# Patient Record
Sex: FEMALE | Race: OTHER | NOT HISPANIC OR LATINO | URBAN - METROPOLITAN AREA
[De-identification: names, ages, dates, MRNs, and addresses within clinical notes are randomized per-mention and may not be internally consistent; named-entity substitution may affect disease eponyms.]

---

## 2017-08-10 ENCOUNTER — EMERGENCY (EMERGENCY)
Facility: HOSPITAL | Age: 62
LOS: 1 days | Discharge: HOME | End: 2017-08-10
Attending: EMERGENCY MEDICINE
Payer: MEDICAID

## 2017-08-10 VITALS
OXYGEN SATURATION: 100 % | DIASTOLIC BLOOD PRESSURE: 120 MMHG | WEIGHT: 154.98 LBS | HEART RATE: 73 BPM | TEMPERATURE: 98 F | HEIGHT: 62 IN | RESPIRATION RATE: 16 BRPM

## 2017-08-10 DIAGNOSIS — H81.10 BENIGN PAROXYSMAL VERTIGO, UNSPECIFIED EAR: ICD-10-CM

## 2017-08-10 DIAGNOSIS — E78.5 HYPERLIPIDEMIA, UNSPECIFIED: ICD-10-CM

## 2017-08-10 DIAGNOSIS — G43.909 MIGRAINE, UNSPECIFIED, NOT INTRACTABLE, WITHOUT STATUS MIGRAINOSUS: ICD-10-CM

## 2017-08-10 DIAGNOSIS — R53.1 WEAKNESS: ICD-10-CM

## 2017-08-10 LAB
ALBUMIN SERPL ELPH-MCNC: 3.2 G/DL — LOW (ref 3.5–5)
ALP SERPL-CCNC: 71 U/L — SIGNIFICANT CHANGE UP (ref 40–120)
ALT FLD-CCNC: 36 U/L DA — SIGNIFICANT CHANGE UP (ref 10–60)
ANION GAP SERPL CALC-SCNC: 8 MMOL/L — SIGNIFICANT CHANGE UP (ref 5–17)
AST SERPL-CCNC: 28 U/L — SIGNIFICANT CHANGE UP (ref 10–40)
BASOPHILS # BLD AUTO: 0.1 K/UL — SIGNIFICANT CHANGE UP (ref 0–0.2)
BASOPHILS NFR BLD AUTO: 1 % — SIGNIFICANT CHANGE UP (ref 0–2)
BILIRUB SERPL-MCNC: 0.2 MG/DL — SIGNIFICANT CHANGE UP (ref 0.2–1.2)
BUN SERPL-MCNC: 23 MG/DL — HIGH (ref 7–18)
CALCIUM SERPL-MCNC: 9.1 MG/DL — SIGNIFICANT CHANGE UP (ref 8.4–10.5)
CHLORIDE SERPL-SCNC: 106 MMOL/L — SIGNIFICANT CHANGE UP (ref 96–108)
CK MB BLD-MCNC: 0.8 % — SIGNIFICANT CHANGE UP (ref 0–3.5)
CK MB CFR SERPL CALC: 2 NG/ML — SIGNIFICANT CHANGE UP (ref 0–3.6)
CK SERPL-CCNC: 254 U/L — HIGH (ref 21–215)
CO2 SERPL-SCNC: 27 MMOL/L — SIGNIFICANT CHANGE UP (ref 22–31)
CREAT SERPL-MCNC: 0.95 MG/DL — SIGNIFICANT CHANGE UP (ref 0.5–1.3)
EOSINOPHIL # BLD AUTO: 0.2 K/UL — SIGNIFICANT CHANGE UP (ref 0–0.5)
EOSINOPHIL NFR BLD AUTO: 2.3 % — SIGNIFICANT CHANGE UP (ref 0–6)
GLUCOSE SERPL-MCNC: 108 MG/DL — HIGH (ref 70–99)
HCT VFR BLD CALC: 35.6 % — SIGNIFICANT CHANGE UP (ref 34.5–45)
HGB BLD-MCNC: 11.4 G/DL — LOW (ref 11.5–15.5)
LYMPHOCYTES # BLD AUTO: 2.7 K/UL — SIGNIFICANT CHANGE UP (ref 1–3.3)
LYMPHOCYTES # BLD AUTO: 31.5 % — SIGNIFICANT CHANGE UP (ref 13–44)
MAGNESIUM SERPL-MCNC: 2.4 MG/DL — SIGNIFICANT CHANGE UP (ref 1.6–2.6)
MCHC RBC-ENTMCNC: 30.7 PG — SIGNIFICANT CHANGE UP (ref 27–34)
MCHC RBC-ENTMCNC: 32.1 GM/DL — SIGNIFICANT CHANGE UP (ref 32–36)
MCV RBC AUTO: 95.6 FL — SIGNIFICANT CHANGE UP (ref 80–100)
MONOCYTES # BLD AUTO: 0.6 K/UL — SIGNIFICANT CHANGE UP (ref 0–0.9)
MONOCYTES NFR BLD AUTO: 7.3 % — SIGNIFICANT CHANGE UP (ref 2–14)
NEUTROPHILS # BLD AUTO: 5 K/UL — SIGNIFICANT CHANGE UP (ref 1.8–7.4)
NEUTROPHILS NFR BLD AUTO: 57.9 % — SIGNIFICANT CHANGE UP (ref 43–77)
NT-PROBNP SERPL-SCNC: 45 PG/ML — SIGNIFICANT CHANGE UP (ref 0–125)
PLATELET # BLD AUTO: 207 K/UL — SIGNIFICANT CHANGE UP (ref 150–400)
POTASSIUM SERPL-MCNC: 4.3 MMOL/L — SIGNIFICANT CHANGE UP (ref 3.5–5.3)
POTASSIUM SERPL-SCNC: 4.3 MMOL/L — SIGNIFICANT CHANGE UP (ref 3.5–5.3)
PROT SERPL-MCNC: 7.4 G/DL — SIGNIFICANT CHANGE UP (ref 6–8.3)
RBC # BLD: 3.72 M/UL — LOW (ref 3.8–5.2)
RBC # FLD: 12.8 % — SIGNIFICANT CHANGE UP (ref 10.3–14.5)
SODIUM SERPL-SCNC: 141 MMOL/L — SIGNIFICANT CHANGE UP (ref 135–145)
TROPONIN I SERPL-MCNC: <0.015 NG/ML — SIGNIFICANT CHANGE UP (ref 0–0.04)
WBC # BLD: 8.6 K/UL — SIGNIFICANT CHANGE UP (ref 3.8–10.5)
WBC # FLD AUTO: 8.6 K/UL — SIGNIFICANT CHANGE UP (ref 3.8–10.5)

## 2017-08-10 PROCEDURE — 99284 EMERGENCY DEPT VISIT MOD MDM: CPT | Mod: 25

## 2017-08-10 PROCEDURE — 82553 CREATINE MB FRACTION: CPT

## 2017-08-10 PROCEDURE — 85027 COMPLETE CBC AUTOMATED: CPT

## 2017-08-10 PROCEDURE — 99285 EMERGENCY DEPT VISIT HI MDM: CPT

## 2017-08-10 PROCEDURE — 96375 TX/PRO/DX INJ NEW DRUG ADDON: CPT

## 2017-08-10 PROCEDURE — 80053 COMPREHEN METABOLIC PANEL: CPT

## 2017-08-10 PROCEDURE — 84484 ASSAY OF TROPONIN QUANT: CPT

## 2017-08-10 PROCEDURE — 83880 ASSAY OF NATRIURETIC PEPTIDE: CPT

## 2017-08-10 PROCEDURE — 70450 CT HEAD/BRAIN W/O DYE: CPT

## 2017-08-10 PROCEDURE — 83735 ASSAY OF MAGNESIUM: CPT

## 2017-08-10 PROCEDURE — 82550 ASSAY OF CK (CPK): CPT

## 2017-08-10 PROCEDURE — 70450 CT HEAD/BRAIN W/O DYE: CPT | Mod: 26

## 2017-08-10 PROCEDURE — 96374 THER/PROPH/DIAG INJ IV PUSH: CPT

## 2017-08-10 RX ORDER — METOCLOPRAMIDE HCL 10 MG
10 TABLET ORAL ONCE
Qty: 0 | Refills: 0 | Status: COMPLETED | OUTPATIENT
Start: 2017-08-10 | End: 2017-08-10

## 2017-08-10 RX ORDER — DIPHENHYDRAMINE HCL 50 MG
50 CAPSULE ORAL ONCE
Qty: 0 | Refills: 0 | Status: COMPLETED | OUTPATIENT
Start: 2017-08-10 | End: 2017-08-10

## 2017-08-10 RX ORDER — MECLIZINE HCL 12.5 MG
1 TABLET ORAL
Qty: 21 | Refills: 0
Start: 2017-08-10 | End: 2017-08-17

## 2017-08-10 RX ORDER — ONDANSETRON 8 MG/1
4 TABLET, FILM COATED ORAL ONCE
Qty: 0 | Refills: 0 | Status: COMPLETED | OUTPATIENT
Start: 2017-08-10 | End: 2017-08-10

## 2017-08-10 RX ORDER — ONDANSETRON 8 MG/1
1 TABLET, FILM COATED ORAL
Qty: 15 | Refills: 0
Start: 2017-08-10 | End: 2017-08-15

## 2017-08-10 RX ORDER — MECLIZINE HCL 12.5 MG
12.5 TABLET ORAL ONCE
Qty: 0 | Refills: 0 | Status: COMPLETED | OUTPATIENT
Start: 2017-08-10 | End: 2017-08-10

## 2017-08-10 RX ADMIN — Medication 12.5 MILLIGRAM(S): at 19:21

## 2017-08-10 RX ADMIN — Medication 104 MILLIGRAM(S): at 19:21

## 2017-08-10 RX ADMIN — Medication 102 MILLIGRAM(S): at 19:21

## 2017-08-10 RX ADMIN — Medication 12.5 MILLIGRAM(S): at 21:40

## 2017-08-10 RX ADMIN — ONDANSETRON 4 MILLIGRAM(S): 8 TABLET, FILM COATED ORAL at 21:45

## 2017-08-10 NOTE — ED ADULT NURSE NOTE - OBJECTIVE STATEMENT
States" I have been having r.ear ache, nausea and dizziness(describes as room spinning) since a month".

## 2017-08-10 NOTE — ED PROVIDER NOTE - MUSCULOSKELETAL, MLM
Spine appears normal, range of motion is not limited, no muscle or joint tenderness, no calf tenderness

## 2017-08-10 NOTE — ED PROVIDER NOTE - OBJECTIVE STATEMENT
61 y/o F pt w/ PMHx of HLD presents to the ED c/o dizziness. Pt states that for the past month she was having intermittent ear pain; last week pt began to feel dizzy intermittently and developed a headache. Associates nausea, weakness and photophobia secondary to symptoms. Pt took Aleve to no relief. Denies vomiting, LOC, fever or any other complaints. NKDA.

## 2018-11-30 ENCOUNTER — APPOINTMENT (OUTPATIENT)
Dept: VASCULAR SURGERY | Facility: CLINIC | Age: 63
End: 2018-11-30
Payer: MEDICAID

## 2018-11-30 VITALS
HEART RATE: 73 BPM | DIASTOLIC BLOOD PRESSURE: 78 MMHG | WEIGHT: 160 LBS | HEIGHT: 62 IN | SYSTOLIC BLOOD PRESSURE: 115 MMHG | BODY MASS INDEX: 29.44 KG/M2

## 2018-11-30 DIAGNOSIS — M79.604 PAIN IN RIGHT LEG: ICD-10-CM

## 2018-11-30 DIAGNOSIS — I83.90 ASYMPTOMATIC VARICOSE VEINS OF UNSPECIFIED LOWER EXTREMITY: ICD-10-CM

## 2018-11-30 DIAGNOSIS — M79.605 PAIN IN RIGHT LEG: ICD-10-CM

## 2018-11-30 PROCEDURE — 93970 EXTREMITY STUDY: CPT

## 2018-11-30 PROCEDURE — 99204 OFFICE O/P NEW MOD 45 MIN: CPT

## 2018-11-30 RX ORDER — CHOLECALCIFEROL (VITAMIN D3) 25 MCG
TABLET ORAL
Refills: 0 | Status: ACTIVE | COMMUNITY

## 2018-11-30 RX ORDER — BACILLUS COAGULANS/INULIN 1B-250 MG
CAPSULE ORAL
Refills: 0 | Status: ACTIVE | COMMUNITY

## 2018-11-30 RX ORDER — ASCORBIC ACID 500 MG
TABLET ORAL
Refills: 0 | Status: ACTIVE | COMMUNITY

## 2020-08-17 ENCOUNTER — FORM ENCOUNTER (OUTPATIENT)
Age: 65
End: 2020-08-17

## 2020-08-17 DIAGNOSIS — M25.562 PAIN IN LEFT KNEE: ICD-10-CM

## 2020-08-24 ENCOUNTER — APPOINTMENT (OUTPATIENT)
Dept: ORTHOPEDIC SURGERY | Facility: CLINIC | Age: 65
End: 2020-08-24
Payer: MEDICARE

## 2020-08-24 VITALS
HEIGHT: 61 IN | WEIGHT: 155 LBS | HEART RATE: 61 BPM | SYSTOLIC BLOOD PRESSURE: 125 MMHG | TEMPERATURE: 97.2 F | BODY MASS INDEX: 29.27 KG/M2 | DIASTOLIC BLOOD PRESSURE: 76 MMHG

## 2020-08-24 PROCEDURE — 73564 X-RAY EXAM KNEE 4 OR MORE: CPT | Mod: LT

## 2020-08-24 PROCEDURE — 73560 X-RAY EXAM OF KNEE 1 OR 2: CPT | Mod: RT

## 2020-08-24 PROCEDURE — 99204 OFFICE O/P NEW MOD 45 MIN: CPT

## 2020-08-24 RX ORDER — CHLORHEXIDINE GLUCONATE, 0.12% ORAL RINSE 1.2 MG/ML
0.12 SOLUTION DENTAL
Qty: 946 | Refills: 0 | Status: ACTIVE | COMMUNITY
Start: 2020-07-27

## 2020-08-24 RX ORDER — DICLOFENAC SODIUM 10 MG/G
1 GEL TOPICAL
Qty: 100 | Refills: 0 | Status: ACTIVE | COMMUNITY
Start: 2020-05-29

## 2020-08-24 RX ORDER — LOPERAMIDE HYDROCHLORIDE 2 MG/1
2 CAPSULE ORAL
Qty: 12 | Refills: 0 | Status: ACTIVE | COMMUNITY
Start: 2020-04-01

## 2020-08-24 RX ORDER — SODIUM FLUORIDE 1.1 G/100G
1.1 GEL, DENTIFRICE ORAL
Qty: 51 | Refills: 0 | Status: ACTIVE | COMMUNITY
Start: 2019-07-03

## 2020-08-24 NOTE — ADDENDUM
[FreeTextEntry1] : This note was written by Yaima Mora on 08/24/2020 acting as scribe for Dr. Faizan Jesus M.D.\par \par I, Dr. Faizan Jesus, have read and attest that all the information, medical decision making and discharge instructions within are true and accurate.

## 2020-08-24 NOTE — HISTORY OF PRESENT ILLNESS
[de-identified] : 64 y/o female presents for initial evaluation of Left knee pain x 9 years. Denies any injuries. She is accompanied by her daughter. Patient reports left knee pain that is diffuse to the back of the knee and is sharp in nature. She report the pain has progressively worsened and is exacerbated with activities. Also reports buckling, locking, swelling and loss of motion. She has decreased the distance in her daily walks and reports increase in pain x last few months Patient takes the stairs one at a time using the handrail. She has been taking Tylenol in the past with minimum relief and does not take NSAID as she reports they upsets her stomach. She uses Voltaren gel with mild relief she Saw Dr. Gunderson and received cortisone injection and saw him again in June 24 with cortisone no relief of pain. She had MRI done July 29 and Dr. Nino recommended to see Dr. Jesus for surgical arthroscopy and advised for left knee symptoms and treatment plan.\par \par Denies: Fever, chills, nausea, vomiting. \par

## 2020-08-24 NOTE — PHYSICAL EXAM
[de-identified] : General appearance: well nourished and hydrated, pleasant, alert and oriented x 3, cooperative.\par HEENT: Normocephalic, EOM intact, Nasal septum midline, Oral cavity clear, External auditory canal clear.\par Cardiovascular: no apparent abnormalities, no lower leg edema, no varicosities, pedal pulses are palpable.\par Lymphatics Lymph nodes: none palpated, Lymphedema: not present.\par Neurologic: sensation is normal, no muscle weakness in upper or lower extremities, patella tendon reflexes intact .\par Dermatologic no apparent skin lesions, moist, warm, no rash.\par Spine:cervical spine appears normal and moves freely, thoracic spine appears normal and moves freely, lumbosacral spine appears normal and moves freely.\par Gait: nonantalgic.\par \par Left knee\par Inspection: Trace effusion \par Wounds: none.\par Alignment: normal.\par Palpation: Medial  tenderness on palpation.\par ROM active (in degrees):0-120 with pain and crepitus Very apprehensive \par Ligamentous laxity: all ligaments appear stable,, negative ant. drawer test, negative post. drawer test, stable to varus stress test, stable to valgus stress test. negative Lachman's test, negative pivot shift test\par Meniscal Test: negative McMurrays, negative Elma.\par Patellofemoral Alignment Test: Q angle-, normal.\par Muscle Test: good quad strength.\par \par Right knee\par Inspection: no effusion or erythema.\par Wounds: none.\par Alignment: normal.\par Palpation: no specific tenderness on palpation.\par ROM active (in degrees):0-130 no instability and good strength\par Ligamentous laxity: all ligaments appear stable,, negative ant. drawer test, negative post. drawer test, stable to varus stress test, stable to valgus stress test. negative Lachman's test, negative pivot shift test\par Meniscal Test: negative McMurrays, negative Elma.\par Patellofemoral Alignment Test: Q angle-, normal.\par Muscle Test: good quad strength.\par \par Left hip\par Inspection: No swelling or ecchymosis.\par Wounds: none.\par Palpation: non-tender.\par Stability: no instability.\par Strength: 5/5 all motor groups.\par ROM: no pain with FROM.\par Leg length: equal.\par \par Right hip\par Inspection: No swelling or ecchymosis.\par Wounds: none.\par Palpation: non-tender.\par Stability: no instability.\par Strength: 5/5 all motor groups.\par ROM: no pain with FROM.\par Leg length: equal.\par \par Left ankle\par Inspection: no erythema noted, no swelling noted.\par Palpation: no pain on palpation .\par ROM: FROM without crepitus.\par Muscle strength: 5/5.\par Stability: no instability noted.\par \par Right ankle\par Inspection: no erythema noted, no swelling noted.\par ROM: FROM without crepitus.\par Palpation: no pain on palpation .\par Muscle strength: 5/5.\par Stability: no instability noted.\par \par Left foot\par Inspection: color, texture and turgor are normal.\par ROM: full range of motion of all joints without pain or crepitus.\par Palpation: no tenderness.\par Stability: no instability noted.\par \par Right foot\par Inspection: color, texture and turgor are normal.\par ROM: full range of motion of all joints without pain or crepitus.\par Palpation: no tenderness.\par Stability: no instability noted.\par \par Left shoulder\par Inspection: no muscle asymmetry, no atrophy.\par Palpation: no tenderness noted, ACJ non-tender.\par ROM: full active ROM, full passive ROM.\par Strength testing): anterior deltoid, supraspinatus, infraspinatus, subscapularis all 5/5.\par Stability test: ant. apprehension negative, post. apprehension negative, relocation test negative.\par Impingement Test: negative NEER.\par \par Right shoulder\par Inspection: no muscle asymmetry, no atrophy.\par Palpation: no tenderness noted, ACJ non-tender.\par ROM: full active ROM, full passive ROM.\par Strength testing): anterior deltoid, supraspinatus, infraspinatus, subscapularis all 5/5.\par Stability test: ant. apprehension negative, post. apprehension negative, relocation test negative.\par Impingement Test: negative NEER.\par Surgical Wounds: none.\par \par Left elbow\par Inspection: negative swelling.\par Wounds: none.\par Palpation: non-tender.\par ROM: full ROM.\par Strength: 5/5 all groups.\par Stability: no instability.\par Mass: none.\par \par Right elbow\par Inspection: negative swelling.\par Wounds: none.\par Palpation: non-tender.\par ROM: full ROM.\par Strength: 5/5 all groups.\par Stability: no instability.\par Mass: none.\par \par Left wrist\par Inspection: negative swelling.\par Wound: none.\par Palpation (bone): no tenderness.\par ROM: full ROM.\par Strength: full , good.\par \par Right wrist\par Inspection: negative swelling.\par Wound: none.\par Palpation (bone): no tenderness.\par ROM: full ROM.\par Strength: full , good.\par \par Left hand\par Inspection: no skin changes, normal appearance.\par Wounds: none.\par Strength: full , able to make full fist.\par Sensation: light touch intact all fingers and thumb.\par Vascular: good capillary refill < 3 seconds, all fingers and thumb.\par Mass: none.\par \par Right hand\par Inspection: no skin changes, normal appearance.\par Wounds: none.\par Strength: full , able to make full fist.\par Sensation: light touch intact all fingers and thumb.\par Vascular: good capillary refill < 3 seconds, all fingers and thumb.\par Mass: none.  [de-identified] : Left knee: Tricompartmental degenerative arthritis, medial joint space narrowing, sclerosis, bone on bone, marginal osteophytes, patella sits centrally with patellofemoral joint space narrowing and osteophyte formation consistent with patellofemoral arthritis\par \par Right merchant. knee xray merchant view taken at the office today demonstrates joint space narrowing, marginal osteophytes, sclerosis consistent with patellofemoral arthritis\par \par MRI Left knee taken 7/20/20: degenerative arthritis and torn medial meniscus films brought in and reviewed, see attached report. I agree with radiologist report including

## 2020-09-10 ENCOUNTER — OUTPATIENT (OUTPATIENT)
Dept: OUTPATIENT SERVICES | Facility: HOSPITAL | Age: 65
LOS: 1 days | Discharge: HOME | End: 2020-09-10
Payer: MEDICARE

## 2020-09-10 VITALS
WEIGHT: 160.94 LBS | HEART RATE: 60 BPM | HEIGHT: 62 IN | TEMPERATURE: 98 F | RESPIRATION RATE: 16 BRPM | OXYGEN SATURATION: 100 % | DIASTOLIC BLOOD PRESSURE: 74 MMHG | SYSTOLIC BLOOD PRESSURE: 136 MMHG

## 2020-09-10 DIAGNOSIS — M17.12 UNILATERAL PRIMARY OSTEOARTHRITIS, LEFT KNEE: ICD-10-CM

## 2020-09-10 DIAGNOSIS — Z98.891 HISTORY OF UTERINE SCAR FROM PREVIOUS SURGERY: Chronic | ICD-10-CM

## 2020-09-10 DIAGNOSIS — Z90.710 ACQUIRED ABSENCE OF BOTH CERVIX AND UTERUS: Chronic | ICD-10-CM

## 2020-09-10 DIAGNOSIS — E78.5 HYPERLIPIDEMIA, UNSPECIFIED: ICD-10-CM

## 2020-09-10 DIAGNOSIS — Z01.818 ENCOUNTER FOR OTHER PREPROCEDURAL EXAMINATION: ICD-10-CM

## 2020-09-10 LAB
ANION GAP SERPL CALC-SCNC: 7 MMOL/L — SIGNIFICANT CHANGE UP (ref 5–17)
APPEARANCE UR: CLEAR — SIGNIFICANT CHANGE UP
APTT BLD: 29 SEC — SIGNIFICANT CHANGE UP (ref 27.5–35.5)
BASOPHILS # BLD AUTO: 0.05 K/UL — SIGNIFICANT CHANGE UP (ref 0–0.2)
BASOPHILS NFR BLD AUTO: 0.6 % — SIGNIFICANT CHANGE UP (ref 0–2)
BILIRUB UR-MCNC: NEGATIVE — SIGNIFICANT CHANGE UP
BLD GP AB SCN SERPL QL: SIGNIFICANT CHANGE UP
BUN SERPL-MCNC: 19 MG/DL — SIGNIFICANT CHANGE UP (ref 7–23)
CALCIUM SERPL-MCNC: 9.1 MG/DL — SIGNIFICANT CHANGE UP (ref 8.5–10.1)
CHLORIDE SERPL-SCNC: 106 MMOL/L — SIGNIFICANT CHANGE UP (ref 96–108)
CO2 SERPL-SCNC: 25 MMOL/L — SIGNIFICANT CHANGE UP (ref 22–31)
COLOR SPEC: YELLOW — SIGNIFICANT CHANGE UP
CREAT SERPL-MCNC: 0.62 MG/DL — SIGNIFICANT CHANGE UP (ref 0.5–1.3)
DIFF PNL FLD: NEGATIVE — SIGNIFICANT CHANGE UP
EOSINOPHIL # BLD AUTO: 0.13 K/UL — SIGNIFICANT CHANGE UP (ref 0–0.5)
EOSINOPHIL NFR BLD AUTO: 1.5 % — SIGNIFICANT CHANGE UP (ref 0–6)
GLUCOSE SERPL-MCNC: 81 MG/DL — SIGNIFICANT CHANGE UP (ref 70–99)
GLUCOSE UR QL: NEGATIVE MG/DL — SIGNIFICANT CHANGE UP
HCT VFR BLD CALC: 34.9 % — SIGNIFICANT CHANGE UP (ref 34.5–45)
HGB BLD-MCNC: 11.4 G/DL — LOW (ref 11.5–15.5)
IMM GRANULOCYTES NFR BLD AUTO: 0.2 % — SIGNIFICANT CHANGE UP (ref 0–1.5)
INR BLD: 0.97 RATIO — SIGNIFICANT CHANGE UP (ref 0.88–1.16)
KETONES UR-MCNC: NEGATIVE — SIGNIFICANT CHANGE UP
LEUKOCYTE ESTERASE UR-ACNC: NEGATIVE — SIGNIFICANT CHANGE UP
LYMPHOCYTES # BLD AUTO: 2.07 K/UL — SIGNIFICANT CHANGE UP (ref 1–3.3)
LYMPHOCYTES # BLD AUTO: 24.4 % — SIGNIFICANT CHANGE UP (ref 13–44)
MCHC RBC-ENTMCNC: 30.1 PG — SIGNIFICANT CHANGE UP (ref 27–34)
MCHC RBC-ENTMCNC: 32.7 GM/DL — SIGNIFICANT CHANGE UP (ref 32–36)
MCV RBC AUTO: 92.1 FL — SIGNIFICANT CHANGE UP (ref 80–100)
MONOCYTES # BLD AUTO: 0.61 K/UL — SIGNIFICANT CHANGE UP (ref 0–0.9)
MONOCYTES NFR BLD AUTO: 7.2 % — SIGNIFICANT CHANGE UP (ref 2–14)
NEUTROPHILS # BLD AUTO: 5.6 K/UL — SIGNIFICANT CHANGE UP (ref 1.8–7.4)
NEUTROPHILS NFR BLD AUTO: 66.1 % — SIGNIFICANT CHANGE UP (ref 43–77)
NITRITE UR-MCNC: NEGATIVE — SIGNIFICANT CHANGE UP
NRBC # BLD: 0 /100 WBCS — SIGNIFICANT CHANGE UP (ref 0–0)
PH UR: 5 — SIGNIFICANT CHANGE UP (ref 5–8)
PLATELET # BLD AUTO: 242 K/UL — SIGNIFICANT CHANGE UP (ref 150–400)
POTASSIUM SERPL-MCNC: 4.3 MMOL/L — SIGNIFICANT CHANGE UP (ref 3.5–5.3)
POTASSIUM SERPL-SCNC: 4.3 MMOL/L — SIGNIFICANT CHANGE UP (ref 3.5–5.3)
PROT UR-MCNC: NEGATIVE MG/DL — SIGNIFICANT CHANGE UP
PROTHROM AB SERPL-ACNC: 11.3 SEC — SIGNIFICANT CHANGE UP (ref 10.6–13.6)
RBC # BLD: 3.79 M/UL — LOW (ref 3.8–5.2)
RBC # FLD: 12.6 % — SIGNIFICANT CHANGE UP (ref 10.3–14.5)
SODIUM SERPL-SCNC: 138 MMOL/L — SIGNIFICANT CHANGE UP (ref 135–145)
SP GR SPEC: 1.01 — SIGNIFICANT CHANGE UP (ref 1.01–1.02)
UROBILINOGEN FLD QL: NEGATIVE MG/DL — SIGNIFICANT CHANGE UP
WBC # BLD: 8.48 K/UL — SIGNIFICANT CHANGE UP (ref 3.8–10.5)
WBC # FLD AUTO: 8.48 K/UL — SIGNIFICANT CHANGE UP (ref 3.8–10.5)

## 2020-09-10 PROCEDURE — 93010 ELECTROCARDIOGRAM REPORT: CPT

## 2020-09-10 NOTE — OCCUPATIONAL THERAPY INITIAL EVALUATION ADULT - ADDITIONAL COMMENTS
Patient lives with spouse (Who can assist post op) in a private house with 4 steps to enter with bilateral handrails that are far apart. Once inside, the patient bedroom and bathroom is on that floor when entering. The patient bathroom has a tub/shower combination, fixed shower head, standard toilet and no grab bars. The patient ambulates with no device and does not own any device for ambulation. The patients daily pain is a 9/10 at rest and a 10/10 with movement. The patient manages the pain with rest, ice and Tylenol. The patient has no recent outpatient PT, no falls and sometimes the knee wilson. The patient wears glasses for reading, L handed, does not drive and has no hearing impairments.

## 2020-09-10 NOTE — H&P PST ADULT - NEGATIVE MUSCULOSKELETAL SYMPTOMS
no muscle cramps/no arm pain L/no arm pain R/no leg pain R/no muscle weakness/no back pain/no arthralgia/no myalgia/no joint swelling/no neck pain

## 2020-09-10 NOTE — H&P PST ADULT - NEGATIVE CARDIOVASCULAR SYMPTOMS
no dyspnea on exertion/no orthopnea/no palpitations/no claudication/no chest pain/no peripheral edema/no paroxysmal nocturnal dyspnea

## 2020-09-10 NOTE — PHYSICAL THERAPY INITIAL EVALUATION ADULT - ADDITIONAL COMMENTS
Pt lives with her  (whom can provide assist upon D/C home) in a private home, 4 entry steps (B/L rails, far apart), 1 level inside home.  Pt has a tub/shower combo with a fixed shower head, standard toilet seat height, & no grab bar. Pt states she is currently independent with all functional mobility including community ambulation without device. Pt states she is independent with ADL's as well. Pt reports daily 9/10 pain & states it is worse with any activity. Pt is left hand dominant, wears eye glasses, doesn't drive, & is retired.  Pt endorses taking narcotics for pain management. Goal of therapy: manage pain & improve functional mobility.

## 2020-09-10 NOTE — H&P PST ADULT - SKIN COMMENTS
Erythema of right back/flank, approx 10cm x 4 cm, warm to touch, very tender to palpation, no central punctate, no skin breaks indurated, not fluctuant due to how tense it is, localized cellulitis

## 2020-09-10 NOTE — H&P PST ADULT - NSICDXPROBLEM_GEN_ALL_CORE_FT
PROBLEM DIAGNOSES  Problem: Unilateral primary osteoarthritis, left knee  Assessment and Plan: Pre-op instructions given. Pt verbalized understanding  Chlorhexidine wash instructions given  Pending: M/C + Covid19 test/results scheduled 9/21/2020    Problem: Hyperlipidemia  Assessment and Plan: Pt instructed to take meds

## 2020-09-10 NOTE — H&P PST ADULT - HISTORY OF PRESENT ILLNESS
This is a 60 y/o female with HLD 62 y/o female with medical h/o HLD and OA, left knee, presents today for PST for Left Total Knee Replacement scheduled for 9/24/2020

## 2020-09-10 NOTE — PHYSICAL THERAPY INITIAL EVALUATION ADULT - PERTINENT HX OF CURRENT PROBLEM, REHAB EVAL
Patient attends pre-op testing today following consult c Dr. Jesus due to chronic pain to left knee. Elective L TKA is now scheduled in this facility for 9/24/2020.

## 2020-09-10 NOTE — H&P PST ADULT - MUSCULOSKELETAL
negative No joint pain, swelling or deformity; no limitation of movement details… detailed exam no joint warmth/decreased ROM due to pain/no joint swelling/no joint erythema/no calf tenderness/normal strength

## 2020-09-10 NOTE — H&P PST ADULT - FUNCTIONAL SCREEN CURRENT LEVEL: AMBULATION, MLM
2019  EMPLOYEE INFORMATION: EMPLOYER INFORMATION:   NAME: Danika WESLEY   : 1963 [phone]   DATE OF INJURY/EVENT: 2019           Location: [address]   Treating Provider: Jonah Yanez PA-C  Time In:  8:05 AM Time Out:  8:14 AM      DIAGNOSIS:   1. Strain of thoracic region, subsequent encounter    2. Right arm pain      STATUS: This injury is determined to be WORK RELATED.    RETURN TO WORK:   Employee may return to work without restrictions.  Employee is discharged from care.   Return Date: 2019            RESTRICTIONS:   Restrictions are to be followed at work and at home.  Restrictions are in effect until next follow-up visit.  None    TREATMENT PLAN:  Medications for this injury/condition:   None  Referral/Consult:  Diagnostic Testing:   None         Instructions:   You may resume normal duties, continue with your home exercise program.    NEXT RETURN VISIT: as needed     Thank you for the privilege of providing medical care for this injury/condition.  If there are any questions, please call the occupational health clinic at Dept: [phone].    Electronically signed on 2019 at 8:14 AM by:   Jonah Yanez PA-C   [facility]    The physician below agrees with the plan and restrictions placed on the patient by the provider above.  Lanre Perez MD  For any worker's compensation questions or needs, please call [employer]  at [phone].   0 = independent

## 2020-09-10 NOTE — PHYSICAL THERAPY INITIAL EVALUATION ADULT - RANGE OF MOTION EXAMINATION, REHAB EVAL
bilateral upper extremity ROM was WNL (within normal limits)/deficits as listed below/except left knee limited due to pain./bilateral lower extremity was ROM was WNL (within normal limits)

## 2020-09-11 LAB
A1C WITH ESTIMATED AVERAGE GLUCOSE RESULT: 5.7 % — HIGH (ref 4–5.6)
CULTURE RESULTS: SIGNIFICANT CHANGE UP
ESTIMATED AVERAGE GLUCOSE: 117 MG/DL — HIGH (ref 68–114)
MRSA PCR RESULT.: SIGNIFICANT CHANGE UP
S AUREUS DNA NOSE QL NAA+PROBE: SIGNIFICANT CHANGE UP
SPECIMEN SOURCE: SIGNIFICANT CHANGE UP

## 2020-09-21 ENCOUNTER — OUTPATIENT (OUTPATIENT)
Dept: OUTPATIENT SERVICES | Facility: HOSPITAL | Age: 65
LOS: 1 days | Discharge: HOME | End: 2020-09-21

## 2020-09-21 DIAGNOSIS — Z98.891 HISTORY OF UTERINE SCAR FROM PREVIOUS SURGERY: Chronic | ICD-10-CM

## 2020-09-21 DIAGNOSIS — U07.1 COVID-19: ICD-10-CM

## 2020-09-21 DIAGNOSIS — Z90.710 ACQUIRED ABSENCE OF BOTH CERVIX AND UTERUS: Chronic | ICD-10-CM

## 2020-09-21 LAB — SARS-COV-2 RNA SPEC QL NAA+PROBE: SIGNIFICANT CHANGE UP

## 2020-09-21 RX ORDER — POLYETHYLENE GLYCOL 3350 17 G/17G
17 POWDER, FOR SOLUTION ORAL DAILY
Refills: 0 | Status: DISCONTINUED | OUTPATIENT
Start: 2020-09-24 | End: 2020-09-25

## 2020-09-21 RX ORDER — METOCLOPRAMIDE HCL 10 MG
10 TABLET ORAL EVERY 6 HOURS
Refills: 0 | Status: DISCONTINUED | OUTPATIENT
Start: 2020-09-24 | End: 2020-09-24

## 2020-09-21 RX ORDER — BENZOCAINE AND MENTHOL 5; 1 G/100ML; G/100ML
1 LIQUID ORAL EVERY 6 HOURS
Refills: 0 | Status: DISCONTINUED | OUTPATIENT
Start: 2020-09-24 | End: 2020-09-25

## 2020-09-21 RX ORDER — SIMVASTATIN 20 MG/1
20 TABLET, FILM COATED ORAL AT BEDTIME
Refills: 0 | Status: DISCONTINUED | OUTPATIENT
Start: 2020-09-24 | End: 2020-09-25

## 2020-09-21 RX ORDER — ACETAMINOPHEN 500 MG
975 TABLET ORAL EVERY 6 HOURS
Refills: 0 | Status: DISCONTINUED | OUTPATIENT
Start: 2020-09-24 | End: 2020-09-25

## 2020-09-21 RX ORDER — OXYCODONE HYDROCHLORIDE 5 MG/1
5 TABLET ORAL EVERY 4 HOURS
Refills: 0 | Status: DISCONTINUED | OUTPATIENT
Start: 2020-09-24 | End: 2020-09-25

## 2020-09-21 RX ORDER — DEXAMETHASONE 0.5 MG/5ML
10 ELIXIR ORAL ONCE
Refills: 0 | Status: COMPLETED | OUTPATIENT
Start: 2020-09-25 | End: 2020-09-25

## 2020-09-21 RX ORDER — ENOXAPARIN SODIUM 100 MG/ML
40 INJECTION SUBCUTANEOUS EVERY 24 HOURS
Refills: 0 | Status: DISCONTINUED | OUTPATIENT
Start: 2020-09-25 | End: 2020-09-25

## 2020-09-21 RX ORDER — MAGNESIUM HYDROXIDE 400 MG/1
30 TABLET, CHEWABLE ORAL DAILY
Refills: 0 | Status: DISCONTINUED | OUTPATIENT
Start: 2020-09-24 | End: 2020-09-25

## 2020-09-21 RX ORDER — SODIUM CHLORIDE 9 MG/ML
1000 INJECTION, SOLUTION INTRAVENOUS
Refills: 0 | Status: DISCONTINUED | OUTPATIENT
Start: 2020-09-24 | End: 2020-09-25

## 2020-09-21 RX ORDER — OXYCODONE HYDROCHLORIDE 5 MG/1
10 TABLET ORAL EVERY 4 HOURS
Refills: 0 | Status: DISCONTINUED | OUTPATIENT
Start: 2020-09-24 | End: 2020-09-25

## 2020-09-21 RX ORDER — ONDANSETRON 8 MG/1
4 TABLET, FILM COATED ORAL EVERY 6 HOURS
Refills: 0 | Status: DISCONTINUED | OUTPATIENT
Start: 2020-09-24 | End: 2020-09-25

## 2020-09-21 RX ORDER — HYDROMORPHONE HYDROCHLORIDE 2 MG/ML
0.5 INJECTION INTRAMUSCULAR; INTRAVENOUS; SUBCUTANEOUS EVERY 4 HOURS
Refills: 0 | Status: DISCONTINUED | OUTPATIENT
Start: 2020-09-24 | End: 2020-09-25

## 2020-09-21 RX ORDER — SENNA PLUS 8.6 MG/1
2 TABLET ORAL AT BEDTIME
Refills: 0 | Status: DISCONTINUED | OUTPATIENT
Start: 2020-09-24 | End: 2020-09-25

## 2020-09-21 RX ORDER — CEFAZOLIN SODIUM 1 G
2000 VIAL (EA) INJECTION EVERY 8 HOURS
Refills: 0 | Status: COMPLETED | OUTPATIENT
Start: 2020-09-24 | End: 2020-09-24

## 2020-09-21 RX ORDER — CELECOXIB 200 MG/1
200 CAPSULE ORAL DAILY
Refills: 0 | Status: DISCONTINUED | OUTPATIENT
Start: 2020-09-25 | End: 2020-09-25

## 2020-09-23 ENCOUNTER — FORM ENCOUNTER (OUTPATIENT)
Age: 65
End: 2020-09-23

## 2020-09-24 ENCOUNTER — RESULT REVIEW (OUTPATIENT)
Age: 65
End: 2020-09-24

## 2020-09-24 ENCOUNTER — TRANSCRIPTION ENCOUNTER (OUTPATIENT)
Age: 65
End: 2020-09-24

## 2020-09-24 ENCOUNTER — INPATIENT (INPATIENT)
Facility: HOSPITAL | Age: 65
LOS: 0 days | Discharge: HOME HEALTH | End: 2020-09-25
Attending: ORTHOPAEDIC SURGERY | Admitting: ORTHOPAEDIC SURGERY
Payer: MEDICARE

## 2020-09-24 ENCOUNTER — APPOINTMENT (OUTPATIENT)
Dept: ORTHOPEDIC SURGERY | Facility: HOSPITAL | Age: 65
End: 2020-09-24

## 2020-09-24 VITALS
DIASTOLIC BLOOD PRESSURE: 50 MMHG | HEART RATE: 73 BPM | SYSTOLIC BLOOD PRESSURE: 132 MMHG | RESPIRATION RATE: 20 BRPM | TEMPERATURE: 98 F | HEIGHT: 62 IN | OXYGEN SATURATION: 99 % | WEIGHT: 158.07 LBS

## 2020-09-24 DIAGNOSIS — Z98.891 HISTORY OF UTERINE SCAR FROM PREVIOUS SURGERY: Chronic | ICD-10-CM

## 2020-09-24 DIAGNOSIS — Z90.710 ACQUIRED ABSENCE OF BOTH CERVIX AND UTERUS: Chronic | ICD-10-CM

## 2020-09-24 LAB
ANION GAP SERPL CALC-SCNC: 6 MMOL/L — SIGNIFICANT CHANGE UP (ref 5–17)
BUN SERPL-MCNC: 18 MG/DL — SIGNIFICANT CHANGE UP (ref 7–23)
CALCIUM SERPL-MCNC: 8.9 MG/DL — SIGNIFICANT CHANGE UP (ref 8.5–10.1)
CHLORIDE SERPL-SCNC: 112 MMOL/L — HIGH (ref 96–108)
CO2 SERPL-SCNC: 24 MMOL/L — SIGNIFICANT CHANGE UP (ref 22–31)
CREAT SERPL-MCNC: 0.84 MG/DL — SIGNIFICANT CHANGE UP (ref 0.5–1.3)
GLUCOSE SERPL-MCNC: 162 MG/DL — HIGH (ref 70–99)
HCT VFR BLD CALC: 35.1 % — SIGNIFICANT CHANGE UP (ref 34.5–45)
HGB BLD-MCNC: 11 G/DL — LOW (ref 11.5–15.5)
INR BLD: 1.08 RATIO — SIGNIFICANT CHANGE UP (ref 0.88–1.16)
MCHC RBC-ENTMCNC: 30.2 PG — SIGNIFICANT CHANGE UP (ref 27–34)
MCHC RBC-ENTMCNC: 31.3 GM/DL — LOW (ref 32–36)
MCV RBC AUTO: 96.4 FL — SIGNIFICANT CHANGE UP (ref 80–100)
NRBC # BLD: 0 /100 WBCS — SIGNIFICANT CHANGE UP (ref 0–0)
PLATELET # BLD AUTO: 235 K/UL — SIGNIFICANT CHANGE UP (ref 150–400)
POTASSIUM SERPL-MCNC: 4.6 MMOL/L — SIGNIFICANT CHANGE UP (ref 3.5–5.3)
POTASSIUM SERPL-SCNC: 4.6 MMOL/L — SIGNIFICANT CHANGE UP (ref 3.5–5.3)
PROTHROM AB SERPL-ACNC: 12.5 SEC — SIGNIFICANT CHANGE UP (ref 10.6–13.6)
RBC # BLD: 3.64 M/UL — LOW (ref 3.8–5.2)
RBC # FLD: 12.8 % — SIGNIFICANT CHANGE UP (ref 10.3–14.5)
SODIUM SERPL-SCNC: 142 MMOL/L — SIGNIFICANT CHANGE UP (ref 135–145)
WBC # BLD: 10.89 K/UL — HIGH (ref 3.8–10.5)
WBC # FLD AUTO: 10.89 K/UL — HIGH (ref 3.8–10.5)

## 2020-09-24 PROCEDURE — 27447 TOTAL KNEE ARTHROPLASTY: CPT | Mod: LT

## 2020-09-24 PROCEDURE — 88305 TISSUE EXAM BY PATHOLOGIST: CPT | Mod: 26

## 2020-09-24 PROCEDURE — 73560 X-RAY EXAM OF KNEE 1 OR 2: CPT | Mod: 26,LT

## 2020-09-24 PROCEDURE — 88311 DECALCIFY TISSUE: CPT | Mod: 26

## 2020-09-24 RX ORDER — FENTANYL CITRATE 50 UG/ML
50 INJECTION INTRAVENOUS
Refills: 0 | Status: DISCONTINUED | OUTPATIENT
Start: 2020-09-24 | End: 2020-09-24

## 2020-09-24 RX ORDER — DOCUSATE SODIUM 100 MG
1 CAPSULE ORAL
Qty: 21 | Refills: 0
Start: 2020-09-24 | End: 2020-09-30

## 2020-09-24 RX ORDER — ASPIRIN/CALCIUM CARB/MAGNESIUM 324 MG
1 TABLET ORAL
Qty: 60 | Refills: 0
Start: 2020-09-24 | End: 2020-10-23

## 2020-09-24 RX ORDER — SIMVASTATIN 20 MG/1
1 TABLET, FILM COATED ORAL
Qty: 0 | Refills: 0 | DISCHARGE

## 2020-09-24 RX ORDER — SODIUM CHLORIDE 9 MG/ML
3 INJECTION INTRAMUSCULAR; INTRAVENOUS; SUBCUTANEOUS EVERY 8 HOURS
Refills: 0 | Status: DISCONTINUED | OUTPATIENT
Start: 2020-09-24 | End: 2020-09-24

## 2020-09-24 RX ORDER — ACETAMINOPHEN 500 MG
650 TABLET ORAL ONCE
Refills: 0 | Status: COMPLETED | OUTPATIENT
Start: 2020-09-24 | End: 2020-09-24

## 2020-09-24 RX ORDER — SODIUM CHLORIDE 9 MG/ML
1000 INJECTION, SOLUTION INTRAVENOUS
Refills: 0 | Status: DISCONTINUED | OUTPATIENT
Start: 2020-09-24 | End: 2020-09-24

## 2020-09-24 RX ORDER — CELECOXIB 200 MG/1
200 CAPSULE ORAL ONCE
Refills: 0 | Status: COMPLETED | OUTPATIENT
Start: 2020-09-24 | End: 2020-09-24

## 2020-09-24 RX ORDER — FENTANYL CITRATE 50 UG/ML
25 INJECTION INTRAVENOUS
Refills: 0 | Status: DISCONTINUED | OUTPATIENT
Start: 2020-09-24 | End: 2020-09-24

## 2020-09-24 RX ORDER — ENOXAPARIN SODIUM 100 MG/ML
1 INJECTION SUBCUTANEOUS
Qty: 14 | Refills: 0
Start: 2020-09-24 | End: 2020-10-07

## 2020-09-24 RX ORDER — ASPIRIN/CALCIUM CARB/MAGNESIUM 324 MG
1 TABLET ORAL
Qty: 84 | Refills: 0
Start: 2020-09-24 | End: 2020-11-04

## 2020-09-24 RX ADMIN — Medication 975 MILLIGRAM(S): at 12:20

## 2020-09-24 RX ADMIN — SODIUM CHLORIDE 3 MILLILITER(S): 9 INJECTION INTRAMUSCULAR; INTRAVENOUS; SUBCUTANEOUS at 06:57

## 2020-09-24 RX ADMIN — Medication 100 MILLIGRAM(S): at 23:55

## 2020-09-24 RX ADMIN — SODIUM CHLORIDE 100 MILLILITER(S): 9 INJECTION, SOLUTION INTRAVENOUS at 10:07

## 2020-09-24 RX ADMIN — OXYCODONE HYDROCHLORIDE 10 MILLIGRAM(S): 5 TABLET ORAL at 15:25

## 2020-09-24 RX ADMIN — OXYCODONE HYDROCHLORIDE 10 MILLIGRAM(S): 5 TABLET ORAL at 22:20

## 2020-09-24 RX ADMIN — OXYCODONE HYDROCHLORIDE 10 MILLIGRAM(S): 5 TABLET ORAL at 21:24

## 2020-09-24 RX ADMIN — SIMVASTATIN 20 MILLIGRAM(S): 20 TABLET, FILM COATED ORAL at 21:24

## 2020-09-24 RX ADMIN — Medication 975 MILLIGRAM(S): at 17:48

## 2020-09-24 RX ADMIN — Medication 975 MILLIGRAM(S): at 23:55

## 2020-09-24 RX ADMIN — SODIUM CHLORIDE 75 MILLILITER(S): 9 INJECTION, SOLUTION INTRAVENOUS at 12:22

## 2020-09-24 RX ADMIN — CELECOXIB 200 MILLIGRAM(S): 200 CAPSULE ORAL at 06:56

## 2020-09-24 RX ADMIN — Medication 100 MILLIGRAM(S): at 16:21

## 2020-09-24 RX ADMIN — Medication 975 MILLIGRAM(S): at 17:29

## 2020-09-24 RX ADMIN — Medication 650 MILLIGRAM(S): at 06:56

## 2020-09-24 RX ADMIN — Medication 975 MILLIGRAM(S): at 14:12

## 2020-09-24 RX ADMIN — OXYCODONE HYDROCHLORIDE 10 MILLIGRAM(S): 5 TABLET ORAL at 14:57

## 2020-09-24 NOTE — PROGRESS NOTE ADULT - ASSESSMENT
Assessment:  65y Female s/p LEFT TKA POD #0    -Pain control  -DVT ppx: Lovenox in house starting POD #1  -WBAT/OOB with assistance as needed  -PT/OT  -Ice and elevate  -Encourage incentive spirometry  -Medical management per primary team  -DC planning  -Will discuss with attending and will advise if plan changes.     Assessment:  65y Female s/p LEFT TKA POD #0    -Pain control  -DVT ppx: Lovenox in house starting POD #1  -WBAT/OOB with assistance as needed  -PT/OT  -Ice and elevate  -Encourage incentive spirometry  -Monitor drain ouput  -DC planning  -Will discuss with attending and will advise if plan changes.

## 2020-09-24 NOTE — PHYSICAL THERAPY INITIAL EVALUATION ADULT - GAIT TRAINING, PT EVAL
Pt will be able to ambulate using assistive device up to 200 ft or more, be able to negotiate steps safely observing proper gait, posture and prevent falls.

## 2020-09-24 NOTE — PHYSICAL THERAPY INITIAL EVALUATION ADULT - PERTINENT HX OF CURRENT PROBLEM, REHAB EVAL
Patient with complain of chronic pain in the left knee joint, diagnosed with OA left knee , s/p TKA left knee. WBAT status .

## 2020-09-24 NOTE — DISCHARGE NOTE PROVIDER - HOSPITAL COURSE
Hospital Course:  The patient is a 65y Female status post elective Total Knee Arthroplasty to the left knee after failing outpatient nonoperative conservative management. Patient presented to [facility] after being medically cleared for an elective surgical procedure. The patient was taken to the operating room on date mentioned above. Prophylactic antibiotics were started before the procedure and continued for 24 hours. There were no complications during the procedure and patient tolerated the procedure well. The patient was transferred to the recovery room in stable condition and subsequently to the surgical floor. The patient was placed on Lovenox for anticoagulation while in house to continue until POD 14, then starting POD 15 patient instructed to start ASA 81mg PO BID for a total of 30 days. All home medications were continued. The patient received physical therapy daily and daily labs were followed. The hemovac drain was DC'd on POD #1. The dressing was kept clean, dry, intact. The rest of the hospital stay was unremarkable. Hospital Course:  The patient is a 65y Female status post elective Total Knee Arthroplasty to the left knee after failing outpatient nonoperative conservative management. Patient presented to [facility] after being medically cleared for an elective surgical procedure. The patient was taken to the operating room on date mentioned above. Prophylactic antibiotics were started before the procedure and continued for 24 hours. There were no complications during the procedure and patient tolerated the procedure well. The patient was transferred to the recovery room in stable condition and subsequently to the surgical floor. The patient was placed on Lovenox for anticoagulation while in house then patient instructed to start ASA 81mg PO BID for a total of 6 weeks after leaving the. hospital All home medications were continued. The patient received physical therapy daily and daily labs were followed. The hemovac drain was DC'd on POD #1. The dressing was kept clean, dry, intact. The rest of the hospital stay was unremarkable.

## 2020-09-24 NOTE — OCCUPATIONAL THERAPY INITIAL EVALUATION ADULT - PLANNED THERAPY INTERVENTIONS, OT EVAL
balance training/ROM/bed mobility training/ADL retraining/strengthening/stretching/transfer training

## 2020-09-24 NOTE — DISCHARGE NOTE PROVIDER - CARE PROVIDER_API CALL
Faizan Jesus  ORTHOPAEDIC SURGERY  [address] [place] [address]  Phone: [phone]  Fax: [phone]  Follow Up Time:

## 2020-09-24 NOTE — PHYSICAL THERAPY INITIAL EVALUATION ADULT - PLANNED THERAPY INTERVENTIONS, PT EVAL
strengthening/bed mobility training/gait training/ROM/transfer training/balance training/stair climbing ex.

## 2020-09-24 NOTE — OCCUPATIONAL THERAPY INITIAL EVALUATION ADULT - ADDITIONAL COMMENTS
Pre op assessment- Patient lives with spouse (Who can assist post op) in a private house with 4 steps to enter with bilateral handrails that are far apart. Once inside, the patient bedroom and bathroom is on that floor when entering. The patient bathroom has a tub/shower combination, fixed shower head, standard toilet and no grab bars.

## 2020-09-24 NOTE — DISCHARGE NOTE NURSING/CASE MANAGEMENT/SOCIAL WORK - PATIENT PORTAL LINK FT
You can access the FollowMyHealth Patient Portal offered by [facility] by registering at the following website: [email or web address]. By joining [facility]’s FollowMyHealth portal, you will also be able to view your health information using other applications (apps) compatible with our system.

## 2020-09-24 NOTE — DISCHARGE NOTE PROVIDER - NSDCMRMEDTOKEN_GEN_ALL_CORE_FT
simvastatin 20 mg oral tablet: 1 tab(s) orally once a day (at bedtime)   aspirin 81 mg oral delayed release tablet: 1 tab(s) orally 2 times a day       Colace 100 mg oral capsule: 1 cap(s) orally 3 times a day, As Needed -for constipation   oxycodone-acetaminophen 5 mg-325 mg oral tablet: 1 tab(s) orally every 4 hours MDD:6  simvastatin 20 mg oral tablet: 1 tab(s) orally once a day (at bedtime)

## 2020-09-24 NOTE — PHYSICAL THERAPY INITIAL EVALUATION ADULT - LIVES WITH, PROFILE
Pt lives with her  (whom can provide assist upon D/C home) in a private home, 4 entry steps (B/L rails, far apart), 1 level inside home.

## 2020-09-24 NOTE — ASU PREOP CHECKLIST - SITE MARKED BY ANESTHESIOLOGIST
Triage: Patient presents from home via EMS for complaints of bright red blood in her stool. \" I have had too many episodes to count it's constant\". Also complaining of feeling weak and nausea. n/a

## 2020-09-24 NOTE — PHYSICAL THERAPY INITIAL EVALUATION ADULT - ADDITIONAL COMMENTS
· Additional Comments	Pt lives with her  (whom can provide assist upon D/C home) in a private home, 4 entry steps (B/L rails, far apart), 1 level inside home.  Pt has a tub/shower combo with a fixed shower head, standard toilet seat height, & no grab bar. Pt states she is currently independent with all functional mobility including community ambulation without device. Pt states she is independent with ADL's as well. Pt reports daily 9/10 pain & states it is worse with any activity. Pt is left hand dominant, wears eye glasses, doesn't drive, & is retired.  Pt endorses taking narcotics for pain management. Goal of therapy: manage pain & improve functional mobility.

## 2020-09-24 NOTE — DISCHARGE NOTE PROVIDER - NSDCFUADDINST_GEN_ALL_CORE_FT
1.	Pain Control  2.	Walking with full weight bearing as tolerated, with assistive devices (walker/Cane as Needed)  3.	DVT Prophylaxis, Lovenox 40mg subcutaneous daily for 14 days. Day 15 start Aspirin 81 mg twice a day for 30 days. do not skip doses.  4.	PT as needed  5.	Follow up with Dr. Jesus as Outpatient in 10-14 Days after Discharge from the Hospital or Rehab. Call Office For Appointment. [phone]  6.	Remove Staples Post-Op Day 21, and Remove Dressing Post-Op Day 10, with Daily Dressing Changes as Need.  7.	Ice/Elevate affected area as Needed  8.	Keep Dressing Clean and dry.   1.	Pain Control  2.	Walking with full weight bearing as tolerated, with assistive devices (walker/Cane as Needed)  3.	DVT Prophylaxis, Aspirin 81 mg twice a day for 6 weeks. do not skip doses.  4.	PT as needed  5.	Follow up with Dr. Jesus as Outpatient in 10-14 Days after Discharge from the Hospital or Rehab. Call Office For Appointment. [phone]  6.	Remove Staples Post-Op Day 21, and Remove Dressing Post-Op Day 10, with Daily Dressing Changes as Need.  7.	Ice/Elevate affected area as Needed  8.	Keep Dressing Clean and dry.

## 2020-09-24 NOTE — PHYSICAL THERAPY INITIAL EVALUATION ADULT - STRENGTHENING, PT EVAL
Improve strength in the left lower extremities to 5/5  and be able to perform functional tasks-bed mobility, sitting, standing, transfers and ambulate in a safe manner with  assistive device and prevent falls.

## 2020-09-25 VITALS
DIASTOLIC BLOOD PRESSURE: 68 MMHG | RESPIRATION RATE: 18 BRPM | OXYGEN SATURATION: 100 % | SYSTOLIC BLOOD PRESSURE: 127 MMHG | HEART RATE: 60 BPM

## 2020-09-25 LAB
ANION GAP SERPL CALC-SCNC: 9 MMOL/L — SIGNIFICANT CHANGE UP (ref 5–17)
BUN SERPL-MCNC: 17 MG/DL — SIGNIFICANT CHANGE UP (ref 7–23)
CALCIUM SERPL-MCNC: 8.9 MG/DL — SIGNIFICANT CHANGE UP (ref 8.5–10.1)
CHLORIDE SERPL-SCNC: 105 MMOL/L — SIGNIFICANT CHANGE UP (ref 96–108)
CO2 SERPL-SCNC: 24 MMOL/L — SIGNIFICANT CHANGE UP (ref 22–31)
CREAT SERPL-MCNC: 0.73 MG/DL — SIGNIFICANT CHANGE UP (ref 0.5–1.3)
GLUCOSE SERPL-MCNC: 130 MG/DL — HIGH (ref 70–99)
HCT VFR BLD CALC: 34.5 % — SIGNIFICANT CHANGE UP (ref 34.5–45)
HGB BLD-MCNC: 10.7 G/DL — LOW (ref 11.5–15.5)
MCHC RBC-ENTMCNC: 29.6 PG — SIGNIFICANT CHANGE UP (ref 27–34)
MCHC RBC-ENTMCNC: 31 GM/DL — LOW (ref 32–36)
MCV RBC AUTO: 95.3 FL — SIGNIFICANT CHANGE UP (ref 80–100)
NRBC # BLD: 0 /100 WBCS — SIGNIFICANT CHANGE UP (ref 0–0)
PLATELET # BLD AUTO: 244 K/UL — SIGNIFICANT CHANGE UP (ref 150–400)
POTASSIUM SERPL-MCNC: 4.8 MMOL/L — SIGNIFICANT CHANGE UP (ref 3.5–5.3)
POTASSIUM SERPL-SCNC: 4.8 MMOL/L — SIGNIFICANT CHANGE UP (ref 3.5–5.3)
RBC # BLD: 3.62 M/UL — LOW (ref 3.8–5.2)
RBC # FLD: 12.9 % — SIGNIFICANT CHANGE UP (ref 10.3–14.5)
SODIUM SERPL-SCNC: 138 MMOL/L — SIGNIFICANT CHANGE UP (ref 135–145)
WBC # BLD: 12.8 K/UL — HIGH (ref 3.8–10.5)
WBC # FLD AUTO: 12.8 K/UL — HIGH (ref 3.8–10.5)

## 2020-09-25 RX ADMIN — Medication 975 MILLIGRAM(S): at 12:51

## 2020-09-25 RX ADMIN — Medication 975 MILLIGRAM(S): at 05:58

## 2020-09-25 RX ADMIN — OXYCODONE HYDROCHLORIDE 10 MILLIGRAM(S): 5 TABLET ORAL at 09:57

## 2020-09-25 RX ADMIN — Medication 102 MILLIGRAM(S): at 05:30

## 2020-09-25 RX ADMIN — CELECOXIB 200 MILLIGRAM(S): 200 CAPSULE ORAL at 12:50

## 2020-09-25 RX ADMIN — OXYCODONE HYDROCHLORIDE 10 MILLIGRAM(S): 5 TABLET ORAL at 10:57

## 2020-09-25 RX ADMIN — CELECOXIB 200 MILLIGRAM(S): 200 CAPSULE ORAL at 11:50

## 2020-09-25 RX ADMIN — Medication 975 MILLIGRAM(S): at 11:51

## 2020-09-25 RX ADMIN — Medication 975 MILLIGRAM(S): at 05:29

## 2020-09-25 RX ADMIN — Medication 1 TABLET(S): at 11:51

## 2020-09-25 RX ADMIN — Medication 975 MILLIGRAM(S): at 00:42

## 2020-09-25 RX ADMIN — OXYCODONE HYDROCHLORIDE 10 MILLIGRAM(S): 5 TABLET ORAL at 05:50

## 2020-09-25 RX ADMIN — ENOXAPARIN SODIUM 40 MILLIGRAM(S): 100 INJECTION SUBCUTANEOUS at 07:57

## 2020-09-25 RX ADMIN — OXYCODONE HYDROCHLORIDE 10 MILLIGRAM(S): 5 TABLET ORAL at 04:59

## 2020-09-25 RX ADMIN — POLYETHYLENE GLYCOL 3350 17 GRAM(S): 17 POWDER, FOR SOLUTION ORAL at 11:51

## 2020-09-25 NOTE — PROGRESS NOTE ADULT - ASSESSMENT
Assessment:  65y Female s/p LEFT TKA POD #1    -Pain control  -DVT ppx: Lovenox in house starting POD #1  -WBAT/OOB with assistance as needed  -PT/OT  -Ice and elevate  -Encourage incentive spirometry  -Drain DC'd  -DC planning likely Home today following AM PT session if patient is comfortable  -Will discuss with attending and will advise if plan changes.

## 2020-09-25 NOTE — PROGRESS NOTE ADULT - SUBJECTIVE AND OBJECTIVE BOX
Patient seen and examined at bedside, resting comfortably. Tolerated procedure well. Pain well controlled. Denies headache, lightheadedness, dizziness, chest pain, dyspnea, n/v, numbness/tingling. No complaints at this time. Discussed with patient possibility of home today, pt is agreeable.    Vital Signs Last 24 Hrs  T(C): 36.7 (25 Sep 2020 04:55), Max: 37.1 (24 Sep 2020 10:30)  T(F): 98.1 (25 Sep 2020 04:55), Max: 98.8 (24 Sep 2020 10:30)  HR: 62 (25 Sep 2020 04:55) (61 - 83)  BP: 107/65 (25 Sep 2020 04:55) (96/53 - 135/72)  BP(mean): --  RR: 16 (25 Sep 2020 04:55) (12 - 19)  SpO2: 98% (25 Sep 2020 04:55) (96% - 100%)         PHYSICAL EXAM  General: NAD, Awake and Alert    LEFT Lower Extremity:  Dressing c/d/i  Drain DC'd  SCDs in place  L2-S1 SILT  +TA/EHL/FHL/GSC  + DP pulses  Compartments soft and compressible  Calves nontender  
Ortho Postoperative Note     Patient seen and examined at bedside, resting comfortably. Pain well controlled. Denies headache, lightheadedness, dizziness, chest pain, dyspnea, n/v, numbness/tingling. No complaints at this time.     VITAL SIGNS  T(C): 37 (09-24-20 @ 14:30), Max: 37.1 (09-24-20 @ 10:30)  HR: 79 (09-24-20 @ 14:30) (70 - 83)  BP: 129/72 (09-24-20 @ 14:30) (96/53 - 132/50)  RR: 16 (09-24-20 @ 14:30) (12 - 20)  SpO2: 98% (09-24-20 @ 14:30) (96% - 100%)    LABS:                        11.0   10.89 )-----------( 235      ( 24 Sep 2020 09:56 )             35.1     09-24    142  |  112<H>  |  18  ----------------------------<  162<H>  4.6   |  24  |  0.84    Ca    8.9      24 Sep 2020 09:56      PT/INR - ( 24 Sep 2020 09:56 )   PT: 12.5 sec;   INR: 1.08 ratio         PHYSICAL EXAM  General: NAD, Awake and Alert    LEFT Lower Extremity:  Dressing c/d/i  Drain in place  SCDs in place  L2-S1 SILT  +TA/EHL/FHL/GSC  + DP pulses  Compartments soft and compressible  Calves nontender

## 2020-09-25 NOTE — CONSULT NOTE ADULT - SUBJECTIVE AND OBJECTIVE BOX
Chief Complaint:  Patient is a 65y old  Female who presents with a chief complaint of L TKA (25 Sep 2020 06:41)      HPI: Currently no sob or chest pain or palpitation . No current cough or fever . No current nausea or vomiting . Pain controlled . Voiding .      Review of Systems:    General:  No wt loss, fevers, chills, night sweats  Eyes:  Good vision, no reported pain  ENT:  No sore throat, pain, runny nose, dysphagia  CV:  No pain, palpitations, hypo/hypertension  Resp:  No dyspnea, cough, tachypnea, wheezing  GI:  No pain, nausea, vomiting, diarrhea, constipation  :  No pain, bleeding, incontinence, nocturia  Muscle:  No pain, weakness  Breast:  No pain, abscess, mass, discharge  Neuro:  No weakness, tingling, memory problems  Psych:  No fatigue, insomnia, mood problems, depression  Endocrine:  No polyuria, polydypsia, cold/heat intolerance  Heme:  No petechiae, ecchymosis, easy bruisability  Skin:  No rash, tattoos, scars, edema    Relevant Family History: DM . Allergy : NKDA .     Relevant Social History: No smoking .    PMh : Hyperlipidemia . Meds : Zocor .    Physical Exam:      Vital Signs:  Vital Signs Last 24 Hrs  T(C): 36.7 (25 Sep 2020 04:55), Max: 37.1 (24 Sep 2020 10:30)  T(F): 98.1 (25 Sep 2020 04:55), Max: 98.8 (24 Sep 2020 10:30)  HR: 62 (25 Sep 2020 04:55) (61 - 83)  BP: 107/65 (25 Sep 2020 04:55) (96/53 - 135/72)  BP(mean): --  RR: 16 (25 Sep 2020 04:55) (12 - 19)  SpO2: 98% (25 Sep 2020 04:55) (96% - 100%)  Daily Height in cm: 157.48 (24 Sep 2020 11:12)    Daily Weight in k.8 (24 Sep 2020 11:12)  I&O's Summary    24 Sep 2020 07:01  -  25 Sep 2020 07:00  --------------------------------------------------------  IN: 1392.5 mL / OUT: 235 mL / NET: 1157.5 mL        General:  Appears stated age, well-groomed, well-nourished, no distress  HEENT:  NC/AT, patent nares w/ pink mucosa, OP clear w/o lesions, PERRL, EOMI, conjunctivae clear, no thyromegaly, nodules, adenopathy, no JVD  Chest:  Full & symmetric excursion, no increased effort, breath sounds clear  Cardiovascular:  Regular rhythm, S1, S2, no murmur/rub/S3/S4, no carotid/femoral/abdominal bruit, radial/pedal pulses 2+, no edema  Abdomen:  Soft, non-tender, non-distended, normoactive bowel sounds, no HSM  Extremities: Slight fulness left knee . + pulse .  Skin:  No rash/erythema/ecchymoses/petechiae/wounds/abscess/warm/dry  Musculoskeletal: Intact .  Neuro/Psych:  Alert, oriented, normal and symmetric strength in UEs, LEs .     Laboratory:                            10.7   12.80 )-----------( 244      ( 25 Sep 2020 06:29 )             34.5     09-25    138  |  105  |  17  ----------------------------<  130<H>  4.8   |  24  |  0.73    Ca    8.9      25 Sep 2020 06:29            CAPILLARY BLOOD GLUCOSE          PT/INR - ( 24 Sep 2020 09:56 )   PT: 12.5 sec;   INR: 1.08 ratio               Imaging:      Assessment: S/P left knee replacement . Hyperlipidemia .      Plan: PT/OT . Pain control . DVT prophylaxis . Incentive spirometry . Current meds reviewed .

## 2020-09-28 LAB — SURGICAL PATHOLOGY STUDY: SIGNIFICANT CHANGE UP

## 2020-09-30 DIAGNOSIS — K21.9 GASTRO-ESOPHAGEAL REFLUX DISEASE WITHOUT ESOPHAGITIS: ICD-10-CM

## 2020-09-30 DIAGNOSIS — E78.5 HYPERLIPIDEMIA, UNSPECIFIED: ICD-10-CM

## 2020-09-30 DIAGNOSIS — M17.12 UNILATERAL PRIMARY OSTEOARTHRITIS, LEFT KNEE: ICD-10-CM

## 2020-10-12 VITALS — BODY MASS INDEX: 29.27 KG/M2 | HEIGHT: 61 IN | WEIGHT: 155 LBS

## 2020-10-16 ENCOUNTER — APPOINTMENT (OUTPATIENT)
Dept: ORTHOPEDIC SURGERY | Facility: CLINIC | Age: 65
End: 2020-10-16
Payer: MEDICARE

## 2020-10-16 PROCEDURE — 99024 POSTOP FOLLOW-UP VISIT: CPT

## 2020-10-16 NOTE — PROCEDURE
[de-identified] :  Left Knee sutures completely removed \par Observation on incision dry, clean, intact, well healed. Suture site Cleaned with iodine swab after sutures are completely removed. Instructions Keep incision dry and clean, allowed to shower and pat site dry, do not rub dry, contact office is site becomes red, swollen, infected, or you develop a fever.\par

## 2020-10-16 NOTE — HISTORY OF PRESENT ILLNESS
[Healed] : healed [Swelling] : swollen [Doing Well] : is doing well [Excellent Pain Control] : has excellent pain control [No Sign of Infection] : is showing no signs of infection [Staples Removed] : staples were removed [Chills] : no chills [Constipation] : no constipation [Fever] : no fever [Diarrhea] : no diarrhea [Dysuria] : no dysuria [Vomiting] : no vomiting [Nausea] : no nausea [Erythema] : not erythematous [Discharge] : absent of discharge [Dehiscence] : not dehisced [de-identified] : S/P Left TKR for staple removal [de-identified] : Continue Pt. Pain control, amoxicillin for dental prophylaxis, Medical alert card to rupesh mailed to the patient, aspirin for anticoagulation and follow up in 4 weeks with xrays [de-identified] : Left Knee rom 0-90 [de-identified] : Patient presents today 3 weeks s/p Left TKR for staple removal. Patient states that she  is doing well, taking Tylenol/oxycodone for pain, aspirin for anticoagulation and doing therapy.  I went over the operative report with the patient and she received a copy of the report for their own records.\par

## 2020-11-11 ENCOUNTER — APPOINTMENT (OUTPATIENT)
Dept: ORTHOPEDIC SURGERY | Facility: CLINIC | Age: 65
End: 2020-11-11
Payer: MEDICARE

## 2020-11-11 DIAGNOSIS — Z47.1 AFTERCARE FOLLOWING JOINT REPLACEMENT SURGERY: ICD-10-CM

## 2020-11-11 DIAGNOSIS — Z96.652 AFTERCARE FOLLOWING JOINT REPLACEMENT SURGERY: ICD-10-CM

## 2020-11-11 PROCEDURE — 73560 X-RAY EXAM OF KNEE 1 OR 2: CPT | Mod: RT

## 2020-11-11 PROCEDURE — 99024 POSTOP FOLLOW-UP VISIT: CPT

## 2020-11-11 PROCEDURE — 73562 X-RAY EXAM OF KNEE 3: CPT | Mod: LT

## 2020-11-11 NOTE — PHYSICAL EXAM
[de-identified] : General appearance: well nourished and hydrated, pleasant, alert and oriented x 3, cooperative.\par HEENT: Normocephalic, EOM intact, Nasal septum midline, Oral cavity clear, External auditory canal clear.\par Cardiovascular: no apparent abnormalities, no lower leg edema, no varicosities, pedal pulses are palpable.\par Lymphatics Lymph nodes: none palpated, Lymphedema: not present.\par Neurologic: sensation is normal, no muscle weakness in upper or lower extremities, patella tendon reflexes intact .\par Dermatologic no apparent skin lesions, moist, warm, no rash.\par Spine:cervical spine appears normal and moves freely, thoracic spine appears normal and moves freely, lumbosacral spine appears normal and moves freely.\par Gait: nonantalgic. \par \par Left Knee\par Inspection: no effusion, soft tissue swelling \par Wounds: healed midline incision\par Alignment: normal.\par Palpation: no specific tenderness on palpation.\par ROM: Active (in degrees): 0-95\par Ligamentous laxity (neg): negative ant. drawer test, negative post. drawer test, stable to varus stress test, stable to valgus stress test,\par Patellofemoral Alignment Test: Q angle-, normal.\par Muscle Test: good quad strength.\par Leg examination: calf is soft and non-tender.  [de-identified] : Left knee xray, AP, lateral, merchant view taken at the office today demonstrates a total knee replacement in satisfactory position and alignment. No evidence of loosening. The un-resurfaced patella sits in a central position. \par \par Right knee xray merchant view taken at the office today demonstrates patella sits at an appropriate height in a central position, joint space narrowing, small osteophytes, patellofemoral arthritis

## 2020-11-11 NOTE — REASON FOR VISIT
[Follow-Up Visit] : a follow-up visit for [Artificial Knee Joint] : artificial knee joint [Knee Pain] : knee pain [Other: ____] : [unfilled] [FreeTextEntry2] : s/p left total knee replacement

## 2020-11-11 NOTE — HISTORY OF PRESENT ILLNESS
[de-identified] : 65 year old female presents for follow-up evaluation s/p left total knee replacement at 6 weeks. Patient is doing well. She is currently taking Oxycodone prior to physical therapy and at night. She utilizes a cane prn, but is currently weaning off at this time. She denies any new major complaints or issues at this time. Patient is overall doing well.

## 2020-11-11 NOTE — DISCUSSION/SUMMARY
[de-identified] : Patient is doing well following their s/p left total knee replacement at 6 weeks. I reviewed x-rays with them. I have reassured her that her implants are functioning well. \par \par She is encouraged to stay active, and continue with her home exercise program, and physical therapy. We have provided a prescription for physical therapy. At this time she is advised to discontinue Oxycodone, and we have we have provided a prescription for Tramadol.\par \par She can continue activities as tolerated. All questions answered, understanding verbalized. Patient in agreement with plan of care. This was explained in the presence of her daughter. She may follow up with xrays in 6-8 weeks.

## 2020-11-11 NOTE — ADDENDUM
[FreeTextEntry1] : This note was written by Yudelka Lund on 11/11/2020 acting as scribe for Dr. Faizan Jesus M.D.\par \par I, Dr. Faizan Jesus, have read and attest that all the information, medical decision making and discharge instructions within are true and accurate.

## 2021-01-13 ENCOUNTER — APPOINTMENT (OUTPATIENT)
Dept: ORTHOPEDIC SURGERY | Facility: CLINIC | Age: 66
End: 2021-01-13
Payer: MEDICARE

## 2021-01-13 VITALS — BODY MASS INDEX: 29.45 KG/M2 | WEIGHT: 156 LBS | HEIGHT: 61 IN

## 2021-01-13 PROCEDURE — 99072 ADDL SUPL MATRL&STAF TM PHE: CPT

## 2021-01-13 PROCEDURE — 73560 X-RAY EXAM OF KNEE 1 OR 2: CPT | Mod: RT

## 2021-01-13 PROCEDURE — 73562 X-RAY EXAM OF KNEE 3: CPT | Mod: LT

## 2021-01-13 PROCEDURE — 99213 OFFICE O/P EST LOW 20 MIN: CPT

## 2021-01-13 NOTE — DISCUSSION/SUMMARY
[de-identified] : Patient is doing well following their s/p left total knee replacement at 3 months. I reviewed x-rays with them. I have reassured her that her implants are functioning well. \par \par Her residual symptoms appear to be more muscular in nature. She is encouraged to stay active, and continue with her home exercise program, and daily stretching.\par \par She can continue activities as tolerated. All questions answered, understanding verbalized. Patient in agreement with plan of care. Patient may follow up with x-rays in 3 months. This was explained in the presence of her daughter.

## 2021-01-13 NOTE — HISTORY OF PRESENT ILLNESS
[de-identified] : 65 year old female presents for follow-up evaluation s/p left total knee replacement at 3 months. She is currently doing home exercises, and going to the gym for multiple days throughout the week. Patient states that she uses the stationary bike and treadmill. She reports that she gets between 6-7000 steps per day. Patient is currently taking Tylenol prn for pain control. She reports swelling after increased activities, which improves with RICE therapy. She denies the use of a gait aid or brace. She complains of mild muscular pain on the posterior aspect of the knee associated with increased activities.

## 2021-01-13 NOTE — ADDENDUM
[FreeTextEntry1] : This note was written by Yudelka Lund on 01/13/2021 acting as scribe for Dr. Faizan Jesus M.D.\par \par I, Dr. Faizan Jesus, have read and attest that all the information, medical decision making and discharge instructions within are true and accurate.  97.3

## 2021-01-13 NOTE — PHYSICAL EXAM
[de-identified] : General appearance: well nourished and hydrated, pleasant, alert and oriented x 3, cooperative.\par HEENT: Normocephalic, EOM intact, Nasal septum midline, Oral cavity clear, External auditory canal clear.\par Cardiovascular: no apparent abnormalities, no lower leg edema, no varicosities, pedal pulses are palpable.\par Lymphatics Lymph nodes: none palpated, Lymphedema: not present.\par Neurologic: sensation is normal, no muscle weakness in upper or lower extremities, patella tendon reflexes intact .\par Dermatologic no apparent skin lesions, moist, warm, no rash.\par Spine:cervical spine appears normal and moves freely, thoracic spine appears normal and moves freely, lumbosacral spine appears normal and moves freely.\par Gait: nonantalgic. \par \par *Patient was able to sit in simone (cross-legged; Christianity) position\par \par Left Knee\par Inspection: no effusion, soft tissue swelling \par Wounds: healed midline incision\par Alignment: normal.\par Palpation: no specific tenderness on palpation.\par ROM: Active (in degrees): 0-125\par Ligamentous laxity (neg): negative ant. drawer test, negative post. drawer test, stable to varus stress test, stable to valgus stress test,\par Patellofemoral Alignment Test: Q angle-, normal.\par Muscle Test: good quad strength.\par Leg examination: calf is soft and non-tender.  [de-identified] : Left knee xray, AP, lateral, merchant view taken at the office today demonstrates a total knee replacement in satisfactory position and alignment. No evidence of loosening. The un-resurfaced patella sits in a central position. \par \par Right knee xray merchant view taken at the office today demonstrates patella sits at an appropriate height in a central position, joint space narrowing, small osteophytes, patellofemoral arthritis

## 2021-04-07 ENCOUNTER — APPOINTMENT (OUTPATIENT)
Dept: ORTHOPEDIC SURGERY | Facility: CLINIC | Age: 66
End: 2021-04-07
Payer: MEDICARE

## 2021-04-07 VITALS — WEIGHT: 157 LBS | HEIGHT: 61 IN | BODY MASS INDEX: 29.64 KG/M2

## 2021-04-07 DIAGNOSIS — M54.5 LOW BACK PAIN: ICD-10-CM

## 2021-04-07 PROCEDURE — 99213 OFFICE O/P EST LOW 20 MIN: CPT

## 2021-04-07 PROCEDURE — 73562 X-RAY EXAM OF KNEE 3: CPT | Mod: LT

## 2021-04-07 PROCEDURE — 73564 X-RAY EXAM KNEE 4 OR MORE: CPT | Mod: RT

## 2021-04-07 PROCEDURE — 99072 ADDL SUPL MATRL&STAF TM PHE: CPT

## 2021-04-07 NOTE — HISTORY OF PRESENT ILLNESS
[de-identified] : CLIFTON EMANUEL is a 66 year female presents for follow-up evaluation s/p left total knee replacement at 6 months. Patient is doing well on the left knee. She states there is some occasional stiffness and discomfort that is relieved with ice and elevation. She does c/o right anterior knee pain that developed recently. She does c/o lower back pain also. She does exercises and goes to the gym x3 a week

## 2021-04-07 NOTE — ADDENDUM
[FreeTextEntry1] : This note was written by Ángel Da Silva on 04/07/2021  acting as scribe for Dr. Faizan Jesus M.D.\par \par I, Dr. Faizan Jesus, have read and attest that all the information, medical decision making and discharge instructions within are true and accurate.

## 2021-04-07 NOTE — DISCUSSION/SUMMARY
[de-identified] : Patient is doing well following their s/p left total knee replacement at 6 months. I reviewed x-rays with them. I have reassured her that her implants are functioning well. \par \par Her right knee symptoms appear to be degenerative arthritis. We will continue non operatively. She is encouraged to stay active, and continue with her home exercise program, and daily stretching. \par \par She also has low back pain which sounds like sciatica. We referred her to see spine specialist Dr. Blakely or Dr. Brown.\par \par She can continue activities as tolerated. All questions answered, understanding verbalized. Patient in agreement with plan of care. Patient may follow up with x-rays in 6 months. This was explained in the presence of her daughter.

## 2021-04-07 NOTE — PHYSICAL EXAM
[de-identified] : General appearance: well nourished and hydrated, pleasant, alert and oriented x 3, cooperative.\par HEENT: Normocephalic, EOM intact, Nasal septum midline, Oral cavity clear, External auditory canal clear.\par Cardiovascular: no apparent abnormalities, no lower leg edema, no varicosities, pedal pulses are palpable.\par Lymphatics Lymph nodes: none palpated, Lymphedema: not present.\par Neurologic: sensation is normal, no muscle weakness in upper or lower extremities, patella tendon reflexes intact .\par Dermatologic no apparent skin lesions, moist, warm, no rash.\par Spine:cervical spine appears normal and moves freely, thoracic spine appears normal and moves freely, lumbosacral spine appears normal and moves freely.\par Gait: nonantalgic. \par \par Patient was able to sit in simone (cross-legged; Gnosticist) position\par \par Left Knee\par Inspection: no effusion, soft tissue swelling \par Wounds: healed midline incision\par Alignment: normal.\par Palpation: no specific tenderness on palpation.\par ROM: Active (in degrees): 0-130\par Ligamentous laxity (neg): negative ant. drawer test, negative post. drawer test, stable to varus stress test, stable to valgus stress test,\par Patellofemoral Alignment Test: Q angle-, normal.\par Muscle Test: good quad strength.\par Leg examination: calf is soft and non-tender. \par \par Right Knee\par Inspection: trace effusion\par Wounds: none.\par Alignment: normal.\par Palpation: no specific tenderness on palpation.\par ROM: Active (in degrees): 0 -115 w/ crepitus\par Ligamentous laxity (neg): all ligaments appear stable, negative ant. drawer test, negative post. drawer test, stable to varus stress test, stable to valgus stress test, negative Lachman's test, negative pivot shift test,\par Meniscal Test: negative McMurrays, negative Elma.\par Patellofemoral Alignment Test: Q angle-, normal.\par Muscle Test: good quad strength.\par Leg examination: calf is soft and non-tender.	  [de-identified] : Left knee xray, AP, lateral, merchant view taken at the office today demonstrates a total knee replacement in satisfactory position and alignment. No evidence of loosening. The un-resurfaced patella sits in a central position. \par \par Right knee xrays, standing AP/Lateral and Merchant films, and 45 degree PA standing view, taken at the office today shows normal alignment, mild medial joint space narrowing, patella at appropriate height, bone ossicle superior pole of patella on lateral view, bone on bone sclerosis, patellofemoral joint space narrowing, Kellgren and Javon grade 2 with significant patellofemoral arthritis.
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
## 2021-10-01 ENCOUNTER — APPOINTMENT (OUTPATIENT)
Dept: ORTHOPEDIC SURGERY | Facility: CLINIC | Age: 66
End: 2021-10-01
Payer: MEDICARE

## 2021-10-01 PROCEDURE — 73562 X-RAY EXAM OF KNEE 3: CPT | Mod: LT

## 2021-10-01 PROCEDURE — 99213 OFFICE O/P EST LOW 20 MIN: CPT

## 2021-10-01 NOTE — PHYSICAL EXAM
Meek Wilkes is a 37 year old male presenting with a laceration on his nose that happened 1 hour ago. Patient states he had a dog toy in  his mouth and his puppy bit his nose.    Medications reviewed and updated.  Denies known Latex allergy or symptoms of Latex sensitivity.  Allergies and tobacco reviewed.    Alvarez Crane MD    Patient would like communication of their results via:        Cell Phone:   Telephone Information:   Mobile [phone]     Okay to leave a message containing results? Yes         [de-identified] : General appearance: well nourished and hydrated, pleasant, alert and oriented x 3, cooperative.\par HEENT: Normocephalic, EOM intact, Nasal septum midline, Oral cavity clear, External auditory canal clear.\par Cardiovascular: no apparent abnormalities, no lower leg edema, no varicosities, pedal pulses are palpable.\par Lymphatics Lymph nodes: none palpated, Lymphedema: not present.\par Neurologic: sensation is normal, no muscle weakness in upper or lower extremities, patella tendon reflexes intact .\par Dermatologic no apparent skin lesions, moist, warm, no rash.\par Spine:cervical spine appears normal and moves freely, thoracic spine appears normal and moves freely, lumbosacral spine appears normal and moves freely.\par Gait: nonantalgic. \par \par Patient was able to sit in simone (cross-legged; Tenriism) position\par \par Left Knee\par Inspection: no effusion, soft tissue swelling \par Wounds: healed midline incision\par Alignment: normal.\par Palpation: no specific tenderness on palpation.\par ROM: Active (in degrees): 0-130\par Ligamentous laxity (neg): negative ant. drawer test, negative post. drawer test, stable to varus stress test, stable to valgus stress test,\par Patellofemoral Alignment Test: Q angle-, normal.\par Muscle Test: good quad strength.\par Leg examination: calf is soft and non-tender. \par \par Right Knee\par Inspection: trace effusion\par Wounds: none.\par Alignment: normal.\par Palpation: no specific tenderness on palpation.\par ROM: Active (in degrees): 0 -115 w/ crepitus\par Ligamentous laxity (neg): all ligaments appear stable, negative ant. drawer test, negative post. drawer test, stable to varus stress test, stable to valgus stress test, negative Lachman's test, negative pivot shift test,\par Meniscal Test: negative McMurrays, negative Elma.\par Patellofemoral Alignment Test: Q angle-, normal.\par Muscle Test: good quad strength.\par Leg examination: calf is soft and non-tender.	  [de-identified] : Left knee xray, AP, lateral, merchant view taken at the office today demonstrates a total knee replacement in satisfactory position and alignment. No evidence of loosening. The un-resurfaced patella sits in a central position. \par \par Right knee xrays, standing AP/Lateral and Merchant films, and 45 degree PA standing view, taken at the office today shows normal alignment, mild medial joint space narrowing, patella at appropriate height, bone ossicle superior pole of patella on lateral view, bone on bone sclerosis, patellofemoral joint space narrowing, Kellgren and Javon grade 2 with significant patellofemoral arthritis.

## 2021-10-01 NOTE — ADDENDUM
[FreeTextEntry1] : This note was written by Pamela Cortes on 10/01/2021 acting as scribe for Dr. Faizan Jesus M.D.\par \par I, Dr. Faizan Jesus, have read and attest that all the information, medical decision making and discharge instructions within are true and accurate.

## 2021-10-01 NOTE — HISTORY OF PRESENT ILLNESS
[de-identified] : CLIFTON EMANUEL  is a 66 year old female who presents for follow up evaluation s/p left total knee replacement at 1 year. Patient is doing well on the left knee. She states there is some occasional stiffness and discomfort that is relieved with ice and elevation. She does exercises and goes to the gym x3 a week

## 2021-10-01 NOTE — DISCUSSION/SUMMARY
[de-identified] : Patient is doing well following their s/p left total knee replacement at 1 year. I reviewed x-rays with them. I have reassured her that her implants are functioning well. \par \par She can continue activities as tolerated. All questions answered, understanding verbalized. Patient in agreement with plan of care. Patient may follow up with x-rays in 1 year.

## 2022-02-15 ENCOUNTER — APPOINTMENT (OUTPATIENT)
Dept: OTOLARYNGOLOGY | Facility: CLINIC | Age: 67
End: 2022-02-15

## 2022-03-19 NOTE — DISCHARGE NOTE PROVIDER - NSDCFUSCHEDAPPT_GEN_ALL_CORE_FT
CLIFTON EMANUEL ; 09/24/2020 ; [facility] [address] ; 10/16/2020 ; [facility] [address] CLIFTON EMANUEL ; 10/16/2020 ; [facility] [address]

## 2022-05-23 ENCOUNTER — APPOINTMENT (OUTPATIENT)
Dept: ORTHOPEDIC SURGERY | Facility: CLINIC | Age: 67
End: 2022-05-23

## 2022-10-05 ENCOUNTER — APPOINTMENT (OUTPATIENT)
Dept: ORTHOPEDIC SURGERY | Facility: CLINIC | Age: 67
End: 2022-10-05

## 2022-10-05 VITALS — BODY MASS INDEX: 30.21 KG/M2 | WEIGHT: 160 LBS | HEIGHT: 61 IN

## 2022-10-05 DIAGNOSIS — M17.0 BILATERAL PRIMARY OSTEOARTHRITIS OF KNEE: ICD-10-CM

## 2022-10-05 PROCEDURE — 73562 X-RAY EXAM OF KNEE 3: CPT | Mod: LT

## 2022-10-05 PROCEDURE — 99213 OFFICE O/P EST LOW 20 MIN: CPT

## 2022-10-05 NOTE — DISCUSSION/SUMMARY
[de-identified] : Pt is doing well s/p left TKR at 2 years. She has mild residual symptoms that are muscular in nature. Patient can continue with home exercise and  activities as tolerated. All questions answered, understanding verbalized. Patient in agreement with plan of care.\par \par I will see her back in 1 year with xrays of both knees since she has mild intermittent symptoms in her right knee.

## 2022-10-05 NOTE — PHYSICAL EXAM
[de-identified] : General appearance: well nourished and hydrated, pleasant, alert and oriented x 3, cooperative.\par HEENT: Normocephalic, EOM intact, Nasal septum midline, Oral cavity clear, External auditory canal clear.\par Cardiovascular: no apparent abnormalities, no lower leg edema, no varicosities, pedal pulses are palpable.\par Lymphatics Lymph nodes: none palpated, Lymphedema: not present.\par Neurologic: sensation is normal, no muscle weakness in upper or lower extremities, patella tendon reflexes intact .\par Dermatologic no apparent skin lesions, moist, warm, no rash.\par Spine:cervical spine appears normal and moves freely, thoracic spine appears normal and moves freely, lumbosacral spine appears normal and moves freely.\par Gait: nonantalgic.\par \par Left Knee\par Inspection: no effusion\par Wounds: healed midline incision\par Alignment: normal.\par Palpation: no specific tenderness on palpation.\par ROM: Active (in degrees): 0-125 \par Ligamentous laxity (neg): negative ant. drawer test, negative post. drawer test, stable to varus stress test, stable to valgus stress test,\par Patellofemoral Alignment Test: Q angle-, normal.\par Muscle Test: good quad strength.\par Leg examination: calf is soft and non-tender. [de-identified] : Left knee xray, AP, lateral, merchant view taken at the office today demonstrates a total knee replacement in satisfactory position and alignment. No evidence of loosening. The patella sits in a central position. Unresurfaced patella\par \par Right knee xray merchant view taken at the office today demonstrates joint space narrowing, marginal osteophytes, sclerosis consistent with patellofemoral arthritis.

## 2022-10-05 NOTE — HISTORY OF PRESENT ILLNESS
[de-identified] : CLIFTON EMANUEL is a 67 year old female who presents for follow up evaluation s/p left TKR at 1 years, Sept 2020. Overall she feels that she is doing well/ C/o occasional discomfort over medial aspect of knee with associated swelling. Typically associated with days that she is waling 5,000 step. It has not worsened. She is satisfied with her TKR, Continues to do yoga and cardio at the gym.

## 2022-10-05 NOTE — ADDENDUM
[FreeTextEntry1] : This note was written by Pamela Cortes on 10/05/2022 acting as scribe for Dr. Faizan Jesus M.D.\par \par I, Dr. Faizan Jesus, have read and attest that all the information, medical decision making and discharge instructions within are true and accurate.

## 2022-10-28 NOTE — PHYSICAL THERAPY INITIAL EVALUATION ADULT - PHYSICAL ASSIST/NONPHYSICAL ASSIST: SIT/SUPINE, REHAB EVAL
Lab Result Notifications    If labs were ordered at your appointment today, we will contact you with results once all your labs have resulted. Please note certain hormone labs can take up to 10 business days to result.     Prescription Policy     Our goal is to serve you on a more timely basis. Currently, our office receives a large volume of phone requests for medication refills. We are requesting your cooperation with the following:    Look over your medications, diabetic supplies, etc. before coming to your appointment to see if you need any refills.    Request your medication (or supply) refills during your office visit.    Outside of an office visit, requests should be directed to your pharmacy even if you have zero refills. Call your pharmacy after 72 hours to see if your prescription is ready for pick-up.     Pharmacy Refills: All prescriptions take 48-72 hours to refill.          Our Patients are Important    Our goal is for your appointment to start at your appointment time.     Please arrive 15 minutes early to allow our staff adequate time to prepare you for your visit to meet with your provider at the scheduled appointment time.     Additionally, if you need to cancel or change your appointment our clinic requests 24 - 48 hours notice, to allow us to refill that open appointment.     We want to improve and you can help. You may receive a survey asking you about this visit. Please complete this survey; we will use your feedback to make improvements. Thank you.           set-up required/verbal cues

## 2022-12-06 NOTE — PHYSICAL THERAPY INITIAL EVALUATION ADULT - LEVEL OF INDEPENDENCE: STAIR NEGOTIATION, REHAB EVAL
Initial Anesthesia Post-op Note    Patient: Errol Torres  Procedure(s) Performed: SEPTOPLASTY AND INFERIOR TURBINATE RESECTIONSEPTOPLASTY AND INFERIOR TURBINATE RESECTION - BILATERAL  Anesthesia type: General    Vitals Value Taken Time   Temp 36 12/06/22 1135   Pulse 71 12/06/22 1133   Resp 39 12/06/22 1133   SpO2 93 % 12/06/22 1133   /82 12/06/22 1133   Vitals shown include unvalidated device data.      Patient Location: PACU Phase 1  Post-op Vital Signs:stable  Level of Consciousness: participates in exam, awake and alert  Respiratory Status: spontaneous ventilation, unassisted and room air  Cardiovascular blood pressure returned to baseline  Hydration: euvolemic  Pain Management: well controlled and adequately managed  Handoff: Handoff to receiving clinician was performed and questions were answered  Vomiting: none  Nausea: None  Airway Patency:patent  Post-op Assessment: no complications, patient tolerated procedure well with no complications, no evidence of recall, regional anesthetic in place - able to participate, dentition within defined limits, moving all extremities and No Corneal Abrasion      No complications documented.   supervision

## 2023-03-02 ENCOUNTER — NON-APPOINTMENT (OUTPATIENT)
Age: 68
End: 2023-03-02

## 2023-03-02 ENCOUNTER — APPOINTMENT (OUTPATIENT)
Dept: OTOLARYNGOLOGY | Facility: CLINIC | Age: 68
End: 2023-03-02
Payer: MEDICARE

## 2023-03-02 PROCEDURE — 99204 OFFICE O/P NEW MOD 45 MIN: CPT | Mod: 25

## 2023-03-02 PROCEDURE — 31231 NASAL ENDOSCOPY DX: CPT

## 2023-03-02 NOTE — PROCEDURE
[FreeTextEntry6] : Bilateral nasal endoscopy:\par \par Left:\par Septum midline\par Mild inferior turbinate hypertrophy \par Middle meatus clear, without masses, polyps, or pus\par Sphenoethmoidal recess clear, without masses, polyps, or pus\par \par Right: \par Septum midline\par Mild inferior turbinate hypertrophy\par Middle meatus clear, without masses, polyps, or pus \par Sphenoethmoidal recess clear, without masses, polyps, or pus\par \par

## 2023-03-02 NOTE — HISTORY OF PRESENT ILLNESS
[de-identified] : 68F with hx of allergy. Recent allergy testing shows + to trees and grasses and dog dander. Has long standing forehead pressure, orbital pressure, headache, itching watery eyes, itchy ears, nasal congestion. Was given symbicort by pulmonologist. Uses irrigations rarely because it clogs ears. Uses flonase infrequently. Takes allegra PRN but makes tired. Congested R>L.

## 2023-03-09 RX ORDER — OLOPATADINE HCL 1 MG/ML
0.1 SOLUTION/ DROPS OPHTHALMIC TWICE DAILY
Qty: 2 | Refills: 2 | Status: ACTIVE | COMMUNITY
Start: 2023-03-02 | End: 1900-01-01

## 2023-03-09 RX ORDER — MOMETASONE 50 UG/1
50 SPRAY, METERED NASAL
Qty: 2 | Refills: 3 | Status: ACTIVE | COMMUNITY
Start: 2023-03-02 | End: 1900-01-01

## 2023-06-08 ENCOUNTER — APPOINTMENT (OUTPATIENT)
Dept: OTOLARYNGOLOGY | Facility: CLINIC | Age: 68
End: 2023-06-08
Payer: MEDICARE

## 2023-06-08 VITALS
HEIGHT: 57.5 IN | HEART RATE: 72 BPM | BODY MASS INDEX: 34.04 KG/M2 | WEIGHT: 160 LBS | DIASTOLIC BLOOD PRESSURE: 68 MMHG | SYSTOLIC BLOOD PRESSURE: 106 MMHG

## 2023-06-08 DIAGNOSIS — J30.9 ALLERGIC RHINITIS, UNSPECIFIED: ICD-10-CM

## 2023-06-08 DIAGNOSIS — R09.81 NASAL CONGESTION: ICD-10-CM

## 2023-06-08 PROCEDURE — 31231 NASAL ENDOSCOPY DX: CPT

## 2023-06-08 PROCEDURE — 99213 OFFICE O/P EST LOW 20 MIN: CPT | Mod: 25

## 2023-06-08 RX ORDER — TRAMADOL HYDROCHLORIDE 50 MG/1
50 TABLET, COATED ORAL
Qty: 40 | Refills: 0 | Status: COMPLETED | COMMUNITY
Start: 2020-11-11 | End: 2023-06-08

## 2023-06-08 RX ORDER — AMOXICILLIN 500 MG/1
500 TABLET, FILM COATED ORAL
Qty: 20 | Refills: 2 | Status: COMPLETED | COMMUNITY
Start: 2020-10-16 | End: 2023-06-08

## 2023-06-08 RX ORDER — FLUOCINOLONE ACETONIDE 0.11 MG/ML
0.01 OIL AURICULAR (OTIC) DAILY
Qty: 1 | Refills: 2 | Status: ACTIVE | COMMUNITY
Start: 2023-06-08 | End: 1900-01-01

## 2023-06-08 RX ORDER — ACETAMINOPHEN 325 MG/1
325 TABLET ORAL
Qty: 30 | Refills: 0 | Status: COMPLETED | COMMUNITY
Start: 2020-04-01 | End: 2023-06-08

## 2023-06-08 RX ORDER — MOMETASONE 50 UG/1
50 SPRAY, METERED NASAL
Qty: 2 | Refills: 3 | Status: ACTIVE | COMMUNITY
Start: 2023-06-08 | End: 1900-01-01

## 2023-06-08 RX ORDER — OXYCODONE AND ACETAMINOPHEN 5; 325 MG/1; MG/1
5-325 TABLET ORAL
Qty: 40 | Refills: 0 | Status: COMPLETED | COMMUNITY
Start: 2020-10-02 | End: 2023-06-08

## 2023-06-08 RX ORDER — AZELASTINE HYDROCHLORIDE 137 UG/1
137 SPRAY, METERED NASAL TWICE DAILY
Qty: 2 | Refills: 2 | Status: ACTIVE | COMMUNITY
Start: 2023-06-08 | End: 1900-01-01

## 2023-06-08 RX ORDER — AMOXICILLIN AND CLAVULANATE POTASSIUM 875; 125 MG/1; MG/1
875-125 TABLET, COATED ORAL
Qty: 20 | Refills: 0 | Status: COMPLETED | COMMUNITY
Start: 2020-06-29 | End: 2023-06-08

## 2023-06-08 RX ORDER — AMOXICILLIN 500 MG/1
500 CAPSULE ORAL
Qty: 60 | Refills: 0 | Status: COMPLETED | COMMUNITY
Start: 2020-07-27 | End: 2023-06-08

## 2023-06-08 RX ORDER — TRAMADOL HYDROCHLORIDE 50 MG/1
50 TABLET, COATED ORAL
Qty: 40 | Refills: 0 | Status: COMPLETED | COMMUNITY
Start: 2020-12-01 | End: 2023-06-08

## 2023-06-08 RX ORDER — MELOXICAM 15 MG/1
15 TABLET ORAL
Qty: 30 | Refills: 0 | Status: COMPLETED | COMMUNITY
Start: 2020-03-05 | End: 2023-06-08

## 2023-06-08 RX ORDER — OXYCODONE AND ACETAMINOPHEN 5; 325 MG/1; MG/1
5-325 TABLET ORAL
Qty: 40 | Refills: 0 | Status: COMPLETED | COMMUNITY
Start: 2020-10-26 | End: 2023-06-08

## 2023-06-08 NOTE — HISTORY OF PRESENT ILLNESS
[de-identified] : 68F with hx of allergy rhinitis presents for follow up. \par Recent allergy testing shows + to trees and grasses and dog dander. \par States continues to use mometasone nasal sprays 1x daily. \par States headaches have improved since last visit but not resolved. \par States discontinues nasal irrigations

## 2023-06-08 NOTE — PROCEDURE
[FreeTextEntry6] : Procedure: Bilateral nasal endoscopy (CPT 03045) \par \par Indication: Anterior rhinoscopy was inadequate to evaluate pathology.\par \par Left:\par Septum midline\par Moderate inferior turbinate hypertrophy \par Middle meatus clear, without masses, polyps, or pus\par Sphenoethmoidal recess clear, without masses, polyps, or pus\par \par Right: \par Septum midline\par Moderate inferior turbinate hypertrophy\par Middle meatus clear, without masses, polyps, or pus \par Sphenoethmoidal recess clear, without masses, polyps, or pus\par \par

## 2023-07-09 NOTE — H&P PST ADULT - WEIGHT IN LBS
"[facility]  Psychiatry  Telepsychiatry Consult Note    Please see previous notes:    Patient agreeable to consultation via telepsychiatry.    Tele-Consultation from Psychiatry started: 7/9/2023 at 12:35pm  The chief complaint leading to psychiatric consultation is: bipolar, hallucinations  This consultation was requested by Dr Phipps, the Emergency Department attending physician.  The location of the consulting psychiatrist is  [place] .  The patient location is  [facility] TELEMETRY   The patient arrived at the ED at: [facility]    Also present with the patient at the time of the consultation: family who was at bedside    Patient Identification:   Emi Pablo is a 66 y.o. female.    Patient information was obtained from patient, relative(s), and past medical records.  Patient presented voluntarily to the Emergency Department     Consults  Teleconsult Time Documentation  Subjective:     History of Present Illness:  65 yo F with hx of CHACORTA, MDD vs bipolar admitted for AMS.    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 Pt is accompanied by her sister who provides hx. Per sister, pt was recently admitted to [facility] for 2 weeks, discharged home on 06/09/23 . Since discharge, pt had been having weakness, nausea and vomiting but over the last week sister noticed worsening confusion. Pt was having difficulty with word finding and disorientation. Had a fall approx 1 week ago when she spilled gatorade onto floor and slipped, no LOC. She was seen by PCP earlier this week, was thought to be dehydrated, was advised to come into the ER but refused. Today on eval, pt is awake but apears confused, oriented to self and "[facility]" but having some word salad/word finding difficulty. Reports blurry vision but no HA " or abd pain. Along with confusion, sister reports poor PO intake and chronic constipation over last few weeks. Denies fevers. In the ED, initial VSS, pt afebrile. Work up notable for: WBC wnl, Cr 0.8, , ALT 79, Tbili 1.4, lactate 4, procal 0.4. UA neg for UTI. CXR with no acute process. CT head with chronic microvascular ischemic changes. PT received sepsis fluids in ED, IV Cefepime. Hospital Medicine consulted for admission.  Per sister, pt unable to obtain MRI as she has a stimulator in place. Sister is also patient's HCPOA and reports code status to be DNR/DNI. Of note, pt was enrolled in home palliative care for symptom management prior to admission but was not on hospice per sister.         Overview/Hospital Course:  65 y/o female admitted with c/o confusion with n/v and weakness that has worsened over the last week. Patient just returned from [facility] where she was in a clinical trial for eccrine spiroadenoma. She had to be taken out of the trial d/t liver metastasis. She was sent out to follow up with Dr. Rey in  to get established and continue with management and treatment options. She is under palliative care currently, has not transitioned to hospice waiting on her follow up with Dr. Rey to see if he can recommend any treatment options first. If not, she wished to transition to hospice. Lactate and procal were elevated on admission and she was started on IV cefepime.   Patient has not been sleeping well, eating or drinking since being back from [facility] with increased agitation and confusion. She said they sent her home to die and that it is weighing heavy on her causing a great deal of anxiety.   She denies any pain.      And     * Acute encephalopathy  -differential includes occult infection vs. Hepatic encephalopathy vs. Due to malignancy vs. IVVD   -CT head neg for acute findings. Unable to obtain MRI per sister as pt has a stimulator in place that is incompatible   -ammonia,  "B12, folate, TSH, RPR WNL  -PT/OT/ST recommending home with PT/OT  -hold sedating meds      Bipolar affective disorder, currently depressed, moderate  Patient has persistent depression which is moderate and is currently uncontrolled. Will Increase anti-depressant medications. We will not consult psychiatry at this time. Patient does not display psychosis at this time. Continue to monitor closely and adjust plan of care as needed.  -add Seroquel 50 mg daily, 100 mg HS  -give haldol and ativan today, monitor response  -cont Vibryd (will need to take home med, informed sister), trazodone, lamictal  -will need close follow up with psychiatry after discharge     Metabolic acidosis  -Lactate and procal mildly elevated on admission, no clear s/s of infection at this time, may be due to IVVD  -Continue empiric IV Cefepime for now  -BC NGTD  -Continue IV fluids  -Repeat lactate trending down  -CO2 17  -start bicarb TID         "    Patient received ativan and valium prior to me seeing her for head CT she received  On interview, patient was sedated and did not arouse to interview. Sister at bedside reports since getting out of hospital in [place] in recent weeks and one week ago started to become agitated and disoriented along with trouble speaking. Sister reports patients bipolar does not manifest like then when she gets unstable.  Floor nurse reports no aggression but patient continues to be confused.      Unable to obtain ros due to inability to participate  This is the extent of patients complaints at this time    Psychiatric History:   Previous Psychiatric Hospitalizations: Yes   Hx of positive response to ECT  Previous Medication Trials: Yes   Previous Suicide Attempts: none that family was aware of  History of Violence: no  History of Depression: yes  History of Leigh: no  History of Auditory/Visual Hallucination yes per family  History of Delusions: yes  Outpatient psychiatrist (current & past): Dr" Luis    Substance Abuse History:    Alcohol: No  Illicit Substances:No  Detox/Rehab: No    Legal History: Past charges/incarcerations: No     Family Psychiatric History:   Older sister- dementia      Social History:  Lives on her own. Son who lives nearby. Retired physician .    Psychiatric Mental Status Exam:  Arousal: stuperous  Sensorium/Orientation: unable to obtain due to inability to participate  Behavior/Cooperation: unable to obtain due to inability to participate  Speech: unable to obtain due to inability to participate  Language: unable to obtain due to inability to participate  Mood: unable to obtain due to inability to participate  Affect: unable to obtain due to inability to participate  Thought Process: unable to obtain due to inability to participate  Thought Content:   Auditory hallucinations: unable to obtain due to inability to participate  Visual hallucinations: unable to obtain due to inability to participate  Paranoia: unable to obtain due to inability to participate  Delusions:  unable to obtain due to inability to participate  Suicidal ideation: unable to obtain due to inability to participate  Homicidal ideation: unable to obtain due to inability to participate  Attention/Concentration:  unable to obtain due to inability to participate  Memory:    Recent:  unable to obtain due to inability to participate   Remote: unable to obtain due to inability to participate     Fund of Knowledge: unable to obtain due to inability to participate  Abstract reasoning: unable to obtain due to inability to participate  Insight: unable to obtain due to inability to participate  Judgment: unable to obtain due to inability to participate      Past Medical History:   Past Medical History:   Diagnosis Date    Anxiety     Arthritis     hands    Back pain     s/p Nerve stimulator placement     Bipolar disorder     Colon polyp     Hx of hypoglycemia     Hyperlipidemia     Hypoglycemia     Major  depressive disorder     Morphea     on back, not currently active    Myalgia     Opioid dependence in remission     EVELYN (obstructive sleep apnea)     Osteopenia 11/26/2014    Pelvic fracture     left pubuc rami    PONV (postoperative nausea and vomiting)     Refractive error     Skin cancer of breast 05/19/2022    cutaneous adenexal carcinoma/eccrine carcinoma      Laboratory Data:   Labs Reviewed   CBC W/ AUTO DIFFERENTIAL - Abnormal; Notable for the following components:       Result Value    RBC 3.40 (*)     Hemoglobin 10.3 (*)     Hematocrit 31.7 (*)     RDW 19.0 (*)     MPV 9.0 (*)     Immature Granulocytes 2.1 (*)     Immature Grans (Abs) 0.14 (*)     Mono # 1.4 (*)     Lymph % 15.5 (*)     Mono % 20.5 (*)     All other components within normal limits   COMPREHENSIVE METABOLIC PANEL - Abnormal; Notable for the following components:    CO2 22 (*)     Albumin 2.5 (*)     Total Bilirubin 1.4 (*)     Alkaline Phosphatase 723 (*)      (*)     ALT 79 (*)     All other components within normal limits   LACTIC ACID, PLASMA - Abnormal; Notable for the following components:    Lactate (Lactic Acid) 4.0 (*)     All other components within normal limits    Narrative:       LACTIC ACID critical result(s) called and verbal readback obtained   from BRITTNEY JAMIL RN by MALLIKA 07/07/2023 09:51   URINALYSIS, REFLEX TO URINE CULTURE - Abnormal; Notable for the following components:    Protein, UA 1+ (*)     Ketones, UA 1+ (*)     Bilirubin (UA) 1+ (*)     Urobilinogen, UA 4.0-6.0 (*)     All other components within normal limits    Narrative:     Specimen Source->Urine   PROCALCITONIN - Abnormal; Notable for the following components:    Procalcitonin 0.40 (*)     All other components within normal limits   LACTIC ACID, PLASMA - Abnormal; Notable for the following components:    Lactate (Lactic Acid) 3.0 (*)     All other components within normal limits   B-TYPE NATRIURETIC PEPTIDE   LIPASE   TROPONIN I   URINALYSIS  MICROSCOPIC    Narrative:     Specimen Source->Urine   AMMONIA       Allergies:   Review of patient's allergies indicates:   Allergen Reactions    Adhesive Blisters     EKG adhesive from leads     Corticosteroids (glucocorticoids) Itching and Anxiety     Severe anxiety (temporary near psychosis as recently as 4/15)    Imitrex [sumatriptan succinate] Other (See Comments)     Chest tightness       Medications in ER:   Medications   atorvastatin tablet 20 mg (20 mg Oral Given 7/8/23 2119)   cetirizine tablet 10 mg (10 mg Oral Not Given 7/9/23 0911)   sodium chloride 0.9% flush 10 mL (has no administration in time range)   naloxone 0.4 mg/mL injection 0.02 mg (has no administration in time range)   glucose chewable tablet 16 g (has no administration in time range)   glucose chewable tablet 24 g (has no administration in time range)   glucagon (human recombinant) injection 1 mg (has no administration in time range)   dextrose 10% bolus 125 mL 125 mL (has no administration in time range)   dextrose 10% bolus 250 mL 250 mL (has no administration in time range)   heparin (porcine) injection 5,000 Units (5,000 Units Subcutaneous Given 7/9/23 0603)   acetaminophen tablet 650 mg (650 mg Oral Given 7/9/23 1203)   0.9%  NaCl infusion ( Intravenous Verify Only 7/9/23 0811)   albuterol nebulizer solution 2.5 mg (has no administration in time range)   ceFEPIme (MAXIPIME) 2 g in dextrose 5 % in water (D5W) 5 % 100 mL IVPB (MB+) (0 g Intravenous Stopped 7/9/23 1137)   clonazePAM tablet 0.5 mg (0.5 mg Oral Not Given 7/9/23 1005)   lamoTRIgine tablet 200 mg (200 mg Oral Given 7/9/23 0910)   QUEtiapine tablet 100 mg (100 mg Oral Given 7/8/23 2120)   traZODone tablet 100 mg (100 mg Oral Given 7/8/23 2119)   gabapentin capsule 300 mg (300 mg Oral Given 7/8/23 2119)   QUEtiapine tablet 50 mg (50 mg Oral Given 7/9/23 0910)   sodium bicarbonate tablet 650 mg (650 mg Oral Given 7/9/23 0910)   vilazodone Tab 40 mg (has no administration in  time range)   LORazepam injection 0.5 mg (0.5 mg Intravenous Given 7/9/23 1130)   sodium chloride 0.9% bolus 1,779 mL 1,779 mL (0 mLs Intravenous Stopped 7/7/23 1044)   ceFEPIme (MAXIPIME) 1 g in dextrose 5 % in water (D5W) 5 % 100 mL IVPB (MB+) (0 g Intravenous Stopped 7/7/23 1208)   LORazepam injection 1 mg (1 mg Intravenous Given 7/7/23 1132)   0.9%  NaCl infusion (1,000 mLs Intravenous New Bag 7/7/23 1341)   LORazepam injection 2 mg (2 mg Intravenous Given 7/8/23 1331)   haloperidol lactate injection 2.5 mg (2.5 mg Intramuscular Given 7/8/23 1331)   potassium chloride SA CR tablet 40 mEq (40 mEq Oral Given 7/9/23 0910)   iohexoL (OMNIPAQUE 350) injection 100 mL (100 mLs Intravenous Given 7/9/23 1157)   diazePAM tablet 5 mg (5 mg Oral Given 7/9/23 1131)       Medications at home: lamictal, viibryd, seroquel    No new subjective & objective note has been filed under this hospital service since the last note was generated.      Assessment - Diagnosis - Goals:     Diagnosis/Impression:   Delirium (unclear etiology- infection??, med side effect from experimental medication she was taking?? Occult neurologic etiology? Paraneoplastic syndrome??)    Bipolar disorder by hx    Rec:   Lacks capacity to make medical decisions. Continue to utilize sister as surrogate decision maker.  Continue home doses of seroquel, viibryd, lamictal, and trazodone targeting mood disorder that her psychiatrist is prescribing  If mentation remains altered like present, would need to dispo to SNF as family cannot manager her in home setting.  Consider neurology consult to expand differential if diagnostic w/u including head CT is not explanatory.   Continue close nursing supervision, 1:1 sitter for redirection.    Time with patient, speaking with family, coordinating care: 30min      More than 50% of the time was spent counseling/coordinating care    Consulting clinician was informed of the encounter and consult note.    Consultation ended:  7/9/2023 at 1:20pm  Elliot Winters MD  Psychiatry  [facility]     [phone]

## 2023-08-16 NOTE — DISCHARGE NOTE PROVIDER - NSDCCPCAREPLAN_GEN_ALL_CORE_FT
----- Message from Laina Bender MA sent at 8/16/2023  8:46 AM CDT -----  Regarding: medication form / OT form  Medication form and OT form in the inbox    
PRINCIPAL DISCHARGE DIAGNOSIS  Diagnosis: Osteoarthritis of left knee  Assessment and Plan of Treatment:       
No

## 2023-10-06 ENCOUNTER — APPOINTMENT (OUTPATIENT)
Dept: ORTHOPEDIC SURGERY | Facility: CLINIC | Age: 68
End: 2023-10-06
Payer: MEDICARE

## 2023-10-06 ENCOUNTER — TRANSCRIPTION ENCOUNTER (OUTPATIENT)
Age: 68
End: 2023-10-06

## 2023-10-06 VITALS — HEIGHT: 57.5 IN | BODY MASS INDEX: 35.11 KG/M2 | WEIGHT: 165 LBS

## 2023-10-06 DIAGNOSIS — M17.12 UNILATERAL PRIMARY OSTEOARTHRITIS, LEFT KNEE: ICD-10-CM

## 2023-10-06 DIAGNOSIS — Z96.652 PRESENCE OF LEFT ARTIFICIAL KNEE JOINT: ICD-10-CM

## 2023-10-06 PROCEDURE — 73562 X-RAY EXAM OF KNEE 3: CPT | Mod: LT

## 2023-10-06 PROCEDURE — 99213 OFFICE O/P EST LOW 20 MIN: CPT

## 2023-10-12 NOTE — OCCUPATIONAL THERAPY INITIAL EVALUATION ADULT - LONG TERM MEMORY, REHAB EVAL
Detail Level: Zone Plan: Lesion appears to be same shape, size and color as observed at last appointment.  Patient states she also has not noticed change to lesion.  Will continue to monitor at this time and patient was instructed to return to office upon any change or worsening of condition. Initiate Treatment: APPLY SPF OF 30 OR HIGHER intact

## 2024-06-10 ENCOUNTER — RX RENEWAL (OUTPATIENT)
Age: 69
End: 2024-06-10

## 2024-06-10 RX ORDER — AZELASTINE HYDROCHLORIDE 137 UG/1
137 SPRAY, METERED NASAL TWICE DAILY
Qty: 1 | Refills: 2 | Status: ACTIVE | COMMUNITY
Start: 2023-03-02 | End: 1900-01-01

## 2024-10-07 ENCOUNTER — APPOINTMENT (OUTPATIENT)
Dept: ORTHOPEDIC SURGERY | Facility: CLINIC | Age: 69
End: 2024-10-07

## 2025-08-15 ENCOUNTER — APPOINTMENT (OUTPATIENT)
Dept: ORTHOPEDIC SURGERY | Facility: CLINIC | Age: 70
End: 2025-08-15

## 2025-08-19 ENCOUNTER — NON-APPOINTMENT (OUTPATIENT)
Age: 70
End: 2025-08-19

## 2025-08-20 ENCOUNTER — APPOINTMENT (OUTPATIENT)
Dept: ORTHOPEDIC SURGERY | Facility: CLINIC | Age: 70
End: 2025-08-20
Payer: MEDICARE

## 2025-08-20 VITALS — HEIGHT: 62 IN | BODY MASS INDEX: 23.92 KG/M2 | WEIGHT: 130 LBS

## 2025-08-20 DIAGNOSIS — M17.11 UNILATERAL PRIMARY OSTEOARTHRITIS, RIGHT KNEE: ICD-10-CM

## 2025-08-20 DIAGNOSIS — Z96.652 PRESENCE OF LEFT ARTIFICIAL KNEE JOINT: ICD-10-CM

## 2025-08-20 PROCEDURE — 99213 OFFICE O/P EST LOW 20 MIN: CPT | Mod: 25

## 2025-08-20 PROCEDURE — 73564 X-RAY EXAM KNEE 4 OR MORE: CPT | Mod: LT,RT

## 2025-08-20 RX ORDER — LIDOCAINE 4% 40 MG/G
4 PATCH TOPICAL
Qty: 30 | Refills: 0 | Status: ACTIVE | COMMUNITY
Start: 2025-08-20 | End: 1900-01-01

## 2025-08-26 ENCOUNTER — NON-APPOINTMENT (OUTPATIENT)
Age: 70
End: 2025-08-26

## 2025-08-26 RX ORDER — LIDOCAINE 5% 700 MG/1
5 PATCH TOPICAL
Qty: 30 | Refills: 0 | Status: ACTIVE | COMMUNITY
Start: 2025-08-26 | End: 1900-01-01